# Patient Record
Sex: MALE | Race: WHITE | URBAN - METROPOLITAN AREA
[De-identification: names, ages, dates, MRNs, and addresses within clinical notes are randomized per-mention and may not be internally consistent; named-entity substitution may affect disease eponyms.]

---

## 2017-02-06 DIAGNOSIS — Z94.0 KIDNEY REPLACED BY TRANSPLANT: Primary | ICD-10-CM

## 2017-02-08 ENCOUNTER — TELEPHONE (OUTPATIENT)
Dept: TRANSPLANT | Facility: CLINIC | Age: 72
End: 2017-02-08

## 2017-02-08 RX ORDER — AZATHIOPRINE 50 MG/1
TABLET ORAL
Qty: 45 TABLET | Refills: 1 | Status: SHIPPED | OUTPATIENT
Start: 2017-02-08 | End: 2018-04-19

## 2017-02-08 RX ORDER — PREDNISONE 5 MG/1
5 TABLET ORAL DAILY
Qty: 90 TABLET | Refills: 1 | Status: SHIPPED | OUTPATIENT
Start: 2017-02-08 | End: 2018-04-19

## 2017-02-08 NOTE — TELEPHONE ENCOUNTER
"Spoke to Didier Brooks regarding his Rx Refills  Due for a follow up appointment    Didier Brooks upset due to the need for face to face appointment    -kidney transplant  At [facility] in 1981   Andrea questions the need to travel 70 miles to [facility] for an appointment -\"you just want my money \"  Reviewed this is standard practice for  RX refills (yearly appointments)-medicare    Andrea reports that he follow with is PCP once per year with transplant  Labs   -   Reviewed he can have his PCP Rx Refills with local PCP   Confirmed that he follows with Dermatology   Current immunosuppression azathioprine 25 mg once per day with Pred 5 mg   Andrea verbalized understanding will follow up with PCP for Rx Refills    Understands to make an appointment with [facility]             "

## 2017-02-08 NOTE — TELEPHONE ENCOUNTER
RX refills  Overdue for labs and follow up appointment    Issue Didier Peterson Cecilia does not have a transplant episode   Please call clarify follow up with kidney transplant    Currently only to give a 30 day supply due to lack of follow up appointments and transplant  Labs

## 2018-01-17 ENCOUNTER — TELEPHONE (OUTPATIENT)
Dept: TRANSPLANT | Facility: CLINIC | Age: 73
End: 2018-01-17

## 2018-01-17 NOTE — TELEPHONE ENCOUNTER
Patient Call: Transplant   Reason for call: patient would like to set up his appointments but not in [place] and is not sure where to go that is close to his home.

## 2018-01-18 NOTE — TELEPHONE ENCOUNTER
PLAN:  Call Didier Brooks and suggest seeing one of our Nephrologist at our outreach clinic in [place].  Or see a Nephrologist at [facility]  in [facility].  Otherwise he can find a Provider locally that is willing to prescribe his anti rejection medications.

## 2018-01-19 NOTE — TELEPHONE ENCOUNTER
Call placed to patient: Patient states that he's transferring care to [facility] (p [phone] f [phone]). He hasn't been assigned a provider yet however will request that notes from his future visits be faxed to [facility] for continued transplant monitoring.

## 2018-02-28 ENCOUNTER — OFFICE VISIT (OUTPATIENT)
Dept: UROLOGY | Facility: CLINIC | Age: 73
End: 2018-02-28
Payer: COMMERCIAL

## 2018-02-28 VITALS
HEIGHT: 70 IN | WEIGHT: 185 LBS | BODY MASS INDEX: 26.48 KG/M2 | DIASTOLIC BLOOD PRESSURE: 77 MMHG | SYSTOLIC BLOOD PRESSURE: 142 MMHG | HEART RATE: 78 BPM | RESPIRATION RATE: 16 BRPM

## 2018-02-28 DIAGNOSIS — R97.20 ELEVATED PROSTATE SPECIFIC ANTIGEN (PSA): ICD-10-CM

## 2018-02-28 DIAGNOSIS — N40.0 BENIGN PROSTATIC HYPERPLASIA, UNSPECIFIED WHETHER LOWER URINARY TRACT SYMPTOMS PRESENT: Primary | ICD-10-CM

## 2018-02-28 PROCEDURE — 99204 OFFICE O/P NEW MOD 45 MIN: CPT | Performed by: UROLOGY

## 2018-02-28 RX ORDER — TAMSULOSIN HYDROCHLORIDE 0.4 MG/1
0.8 CAPSULE ORAL DAILY
Qty: 180 CAPSULE | Refills: 3 | Status: SHIPPED | OUTPATIENT
Start: 2018-02-28

## 2018-02-28 NOTE — PROGRESS NOTES
Visit Date:   02/28/2018      REASON FOR VISIT TODAY:  Elevated PSA.      BRIEF COURSE:  Mr. Brooks is a 73-year-old gentleman with history of a renal transplant over 30 years ago who had been followed in our clinic previously for a history of elevated PSA.  The patient's PSAs have been noted to be in the 9-10 range dating back at least through 2013.  The patient notes that his PSAs were stable in that range including a value of 9.9 sometime in 2017.  The patient underwent a repeat PSA on 01/30/2018 that was measured at 17.  Of note, the patient has 2 previous negative prostate biopsies, one in 2006 and one in 2013.  The patient has noted some increase in urinary urgency and frequency over the last 6-12 months and notes that he sometimes voids every 20-30 minutes with up to 2 hours between voids.  He does drink Snapple tea on the order of a quart to a quart and a half per day.  Of note, this is caffeinated.  The patient does take Flomax to help with his urinary symptoms, but stopped the medication and felt that it did not make a huge difference when he stopped but nonetheless restarted it and continues on it currently.  In addition, the patient relates that he also has been taking saw palmetto, pygeum and pumpkin seed capsules for the last several years and credits an improvement in his macular degeneration as well as arthritic joint pain and improvements in GERD all to this supplement cocktail he has been taking.      PAST MEDICAL HISTORY:  Otherwise, again, significant for end-stage renal disease status post kidney transplant, hypertension.      PAST SURGICAL HISTORY:  Includes the renal transplant.      MEDICATIONS:  Include Flomax, prednisone, azathioprine, tadalafil, metoprolol, hydrochlorothiazide, amlodipine.      ALLERGIES:  NO ALLERGIES TO MEDICATIONS.      FAMILY HISTORY:  Noncontributory.      SOCIAL HISTORY:  Negative for tobacco and alcohol.      REVIEW OF SYSTEMS:  Negative for fevers, chills, sweats,  nausea, vomiting and unexplained weight changes.      PHYSICAL EXAMINATION:   VITAL:  His blood pressure today is 142/77, pulse is 78.   GENERAL:  He is in no acute distress.   GENITOURINARY:  The patient's AUA symptom score today is measured at 20/35 with a bothersome index of 3.  The patient was able to void 200 mL and a postvoid residual was measured at 0.      ASSESSMENT AND PLAN:  Over half of today's 45-minute visit was spent counseling the patient regarding his lower urinary tract symptoms and his elevated PSA.  I suggested to Mr. Brooks that it is unclear what the source of the rise of his PSA is at this time.  However, given the fact that the patient also has concomitant worsening of his lower urinary tract symptoms, it is certainly possible that these two events are related.  The patient may have continuing increase in the size of his prostate or some other inflammatory condition in his prostate that is causing his urinary symptoms that may also be causing a rise in the PSA.  Toward this end, we will go ahead and increase the patient's Flomax to 0.8 mg p.o. daily to see if this improves his urination.  We will see the patient back in 3 months' time at which point we will recheck his PSA to see if the PSA comes down.  We did discuss that patients do require a 10-15 year life expectancy to benefit from treatment of a screen-detected prostate cancer and thus, we will see what the repeat PSA is before we think about moving toward trying to exclude prostate cancer.  As a next step, the patient would get an MRI of the prostate if needed.  In addition, when the patient comes back in roughly 05/2018, we will plan on performing a cystoscopy at that time to further evaluate the patient unless his symptoms are markedly improved on the increase in the Flomax.  Mr. Brooks is in agreement with the plan.  We will see him back in May with a PSA and AUA symptom score and determine if we need to do the cystoscopy at that  time.         EJ OVALLES MD             D: 2018   T: 2018   MT: FERNANDO      Name:     LUDA SIGALA   MRN:      [id number]        Account:      [id number]   :      1945           Visit Date:   2018      Document: [id number]

## 2018-02-28 NOTE — NURSING NOTE
"Chief Complaint   Patient presents with     Elevated PSA       Initial /77 (BP Location: Left arm, Patient Position: Chair, Cuff Size: Adult Regular)  Pulse 78  Resp 16  Ht 1.765 m (5' 9.5\")  Wt 83.9 kg (185 lb)  BMI 26.93 kg/m2 Estimated body mass index is 26.93 kg/(m^2) as calculated from the following:    Height as of this encounter: 1.765 m (5' 9.5\").    Weight as of this encounter: 83.9 kg (185 lb).  BP completed using cuff size: regular      Rachele Pagan CMA     "

## 2018-02-28 NOTE — LETTER
[facility]  [address] [address]  [phone]        March 9, 2019    Didier Brooks  [address] [place] [address]              Dear Didier Brooks    This is to remind you that your PSA is due.    You may call our office at  to schedule an appointment.    Please disregard this notice if you have already had your labs drawn or made an appointment.        Sincerely,        Donny Kim MD

## 2018-02-28 NOTE — MR AVS SNAPSHOT
"              After Visit Summary   2/28/2018    Didier Brooks    MRN: [id number]           Patient Information     Date Of Birth          1945        Visit Information        Provider Department      2/28/2018 1:00 PM Donny Kim MD [facility]        Today's Diagnoses     Benign prostatic hyperplasia, unspecified whether lower urinary tract symptoms present    -  1    Elevated prostate specific antigen (PSA)          Care Instructions    Per Physician's instructions            Follow-ups after your visit        Your next 10 appointments already scheduled     May 31, 2018  1:45 PM CDT   CYSTO with Donny Kim MD   [facility]    [address] [address]   [phone]              Who to contact     If you have questions or need follow up information about today's clinic visit or your schedule please contact [facility] directly at [phone].  Normal or non-critical lab and imaging results will be communicated to you by MyChart, letter or phone within 4 business days after the clinic has received the results. If you do not hear from us within 7 days, please contact the clinic through [employer] or phone. If you have a critical or abnormal lab result, we will notify you by phone as soon as possible.  Submit refill requests through [employer] or call your pharmacy and they will forward the refill request to us. Please allow 3 business days for your refill to be completed.          Additional Information About Your Visit        [employer] Information     [employer] lets you send messages to your doctor, view your test results, renew your prescriptions, schedule appointments and more. To sign up, go to [email or web address] . Click on \"Log in\" on the left side of the screen, which will take you to the Welcome page. Then click on \"Sign up Now\" on the right side of the page.     You will be asked to " "enter the access code listed below, as well as some personal information. Please follow the directions to create your username and password.     Your access code is: [email or web address]  Expires: 2018 12:33 PM     Your access code will  in 90 days. If you need help or a new code, please call your [facility] clinic or [phone].        Care EveryWhere ID     This is your Care EveryWhere ID. This could be used by other organizations to access your [facility] medical records  [id number]        Your Vitals Were     Pulse Respirations Height BMI (Body Mass Index)          78 16 1.765 m (5' 9.5\") 26.93 kg/m2         Blood Pressure from Last 3 Encounters:   18 142/77   13 161/84    Weight from Last 3 Encounters:   18 83.9 kg (185 lb)   13 87.9 kg (193 lb 12.8 oz)                 Today's Medication Changes          These changes are accurate as of 18 11:59 PM.  If you have any questions, ask your nurse or doctor.               These medicines have changed or have updated prescriptions.        Dose/Directions    * tamsulosin 0.4 MG capsule   Commonly known as:  FLOMAX   This may have changed:  Another medication with the same name was added. Make sure you understand how and when to take each.   Changed by:  Donny Kim MD        Dose:  1 capsule   Take 1 capsule by mouth daily.   Refills:  0       * tamsulosin 0.4 MG capsule   Commonly known as:  FLOMAX   This may have changed:  You were already taking a medication with the same name, and this prescription was added. Make sure you understand how and when to take each.   Used for:  Benign prostatic hyperplasia, unspecified whether lower urinary tract symptoms present   Changed by:  Donny Kim MD        Dose:  0.8 mg   Take 2 capsules (0.8 mg) by mouth daily   Quantity:  180 capsule   Refills:  3       * Notice:  This list has 2 medication(s) that are the same as other medications prescribed for you. Read the " directions carefully, and ask your doctor or other care provider to review them with you.         Where to get your medicines      These medications were sent to [facility] [address] [place] [address]  [address] [place] [address]     Phone:  [phone]     tamsulosin 0.4 MG capsule                Primary Care Provider Fax #    Physician No Ref-Primary [phone]       No address on file        Equal Access to Services     JONH GIMENEZ : Hadii aad ku hadasho Soomaali, waaxda luqadaha, qaybta kaalmada adeegyada, waxay idiin hayaan michael izquierdo . So [facility] [phone].    ATENCIÓN: Si habla espti, tiene a coates disposición servicios gratuitos de asistencia lingüística. Maricarmen al [phone].    We comply with applicable federal civil rights laws and [place] laws. We do not discriminate on the basis of race, color, national origin, age, disability, sex, sexual orientation, or gender identity.            Thank you!     Thank you for choosing [facility]  for your care. Our goal is always to provide you with excellent care. Hearing back from our patients is one way we can continue to improve our services. Please take a few minutes to complete the written survey that you may receive in the mail after your visit with us. Thank you!             Your Updated Medication List - Protect others around you: Learn how to safely use, store and throw away your medicines at www.disposemymeds.org.          This list is accurate as of 2/28/18 11:59 PM.  Always use your most recent med list.                   Brand Name Dispense Instructions for use Diagnosis    amLODIPine 5 MG tablet    NORVASC    180 tablet    Take 2 tablets by mouth daily.    Kidney replaced by transplant       azaTHIOprine 50 MG tablet    IMURAN    45 tablet    Take 1/2 tablet (25 MG) by mouth daily    Kidney replaced by transplant       hydrochlorothiazide 25 MG tablet    HYDRODIURIL    90 tablet     Take 1 tablet by mouth daily.    Unspecified essential hypertension       metoprolol succinate 50 MG 24 hr tablet    TOPROL-XL     Take 50 mg by mouth daily        predniSONE 5 MG tablet    DELTASONE    90 tablet    Take 1 tablet (5 mg) by mouth daily    Kidney replaced by transplant       tadalafil 5 MG tablet    CIALIS    90 tablet    Take 1 tablet (5 mg) by mouth daily Never use with nitroglycerin, terazosin or doxazosin.    ED (erectile dysfunction)       * tamsulosin 0.4 MG capsule    FLOMAX     Take 1 capsule by mouth daily.        * tamsulosin 0.4 MG capsule    FLOMAX    180 capsule    Take 2 capsules (0.8 mg) by mouth daily    Benign prostatic hyperplasia, unspecified whether lower urinary tract symptoms present       * Notice:  This list has 2 medication(s) that are the same as other medications prescribed for you. Read the directions carefully, and ask your doctor or other care provider to review them with you.

## 2018-04-19 DIAGNOSIS — Z94.0 KIDNEY REPLACED BY TRANSPLANT: Primary | ICD-10-CM

## 2018-04-19 RX ORDER — PREDNISONE 5 MG/1
5 TABLET ORAL DAILY
Qty: 30 TABLET | Refills: 0 | Status: SHIPPED | OUTPATIENT
Start: 2018-04-19 | End: 2019-06-14

## 2018-04-19 RX ORDER — AZATHIOPRINE 50 MG/1
25 TABLET ORAL DAILY
Qty: 15 TABLET | Refills: 0 | Status: SHIPPED | OUTPATIENT
Start: 2018-04-19 | End: 2019-06-14
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
## 2018-04-19 NOTE — TELEPHONE ENCOUNTER
Patient Call: Patient Call: Medication Refill  Route to LPN  Instruct the patient to first contact their pharmacy. If they have called their pharmacy and require further assistance, route to LPN.  Pharmacy Name: [facility]  Pharmacy Location: [place], [place]  Name of Medication: Azathiopine 50 mg   When will the patient be out of this medication?: Greater than 3 days (Route standard priority)   Patient states he only needs enough for 15 days until he see his doctor on the 15 of May.

## 2018-04-30 ENCOUNTER — TELEPHONE (OUTPATIENT)
Dept: TRANSPLANT | Facility: CLINIC | Age: 73
End: 2018-04-30

## 2018-04-30 NOTE — TELEPHONE ENCOUNTER
Discussed Shingrix vaccine with Didier Brooks.  Explained as long as it is not a LIVE vaccine.  Verbalized understanding.

## 2018-04-30 NOTE — TELEPHONE ENCOUNTER
Patient call:    Patient needs to know if he can get a shot for shingles. Due to wife having shingles

## 2018-06-12 ENCOUNTER — TELEPHONE (OUTPATIENT)
Dept: TRANSPLANT | Facility: CLINIC | Age: 73
End: 2018-06-12

## 2018-10-31 ENCOUNTER — TELEPHONE (OUTPATIENT)
Dept: UROLOGY | Facility: CLINIC | Age: 73
End: 2018-10-31

## 2018-10-31 NOTE — TELEPHONE ENCOUNTER
Reason for Call:  Other call back    Detailed comments: pt calling stating he would like his PSA order faxed to [facility]. Fax [phone]    Phone Number Patient can be reached at: Cell number on file:    Telephone Information:   Mobile [phone]       Best Time: any     Can we leave a detailed message on this number? YES    Call taken on 10/31/2018 at 9:18 AM by Amy Moralez

## 2018-10-31 NOTE — TELEPHONE ENCOUNTER
Didier is notified that the lab order has been faxed to [facility], with a request to fax the results back to us for Dr AMANUEL Kim at [phone]. Deidre GUTIERREZ Rn

## 2018-11-01 ENCOUNTER — TRANSFERRED RECORDS (OUTPATIENT)
Dept: HEALTH INFORMATION MANAGEMENT | Facility: CLINIC | Age: 73
End: 2018-11-01

## 2018-11-08 ENCOUNTER — OFFICE VISIT (OUTPATIENT)
Dept: UROLOGY | Facility: CLINIC | Age: 73
End: 2018-11-08
Payer: COMMERCIAL

## 2018-11-08 VITALS — RESPIRATION RATE: 16 BRPM | DIASTOLIC BLOOD PRESSURE: 74 MMHG | HEART RATE: 75 BPM | SYSTOLIC BLOOD PRESSURE: 129 MMHG

## 2018-11-08 DIAGNOSIS — N40.0 BENIGN PROSTATIC HYPERPLASIA, UNSPECIFIED WHETHER LOWER URINARY TRACT SYMPTOMS PRESENT: Primary | ICD-10-CM

## 2018-11-08 PROCEDURE — 99214 OFFICE O/P EST MOD 30 MIN: CPT | Performed by: UROLOGY

## 2018-11-08 RX ORDER — TAMSULOSIN HYDROCHLORIDE 0.4 MG/1
0.8 CAPSULE ORAL DAILY
Qty: 180 CAPSULE | Refills: 3 | Status: SHIPPED | OUTPATIENT
Start: 2018-11-08 | End: 2019-05-31

## 2018-11-08 NOTE — MR AVS SNAPSHOT
After Visit Summary   11/8/2018    Didier Brooks    MRN: [id number]           Patient Information     Date Of Birth          1945        Visit Information        Provider Department      11/8/2018 1:45 PM Donny Kim MD [facility]        Today's Diagnoses     Benign prostatic hyperplasia, unspecified whether lower urinary tract symptoms present    -  1       Follow-ups after your visit        Who to contact     If you have questions or need follow up information about today's clinic visit or your schedule please contact [facility] directly at [phone].  Normal or non-critical lab and imaging results will be communicated to you by MyChart, letter or phone within 4 business days after the clinic has received the results. If you do not hear from us within 7 days, please contact the clinic through MyChart or phone. If you have a critical or abnormal lab result, we will notify you by phone as soon as possible.  Submit refill requests through [employer] or call your pharmacy and they will forward the refill request to us. Please allow 3 business days for your refill to be completed.          Additional Information About Your Visit        Care EveryWhere ID     This is your Care EveryWhere ID. This could be used by other organizations to access your [facility] medical records  [id number]        Your Vitals Were     Pulse Respirations                75 16           Blood Pressure from Last 3 Encounters:   11/08/18 129/74   02/28/18 142/77   11/13/13 161/84    Weight from Last 3 Encounters:   02/28/18 83.9 kg (185 lb)   11/13/13 87.9 kg (193 lb 12.8 oz)              Today, you had the following     No orders found for display         Today's Medication Changes          These changes are accurate as of 11/8/18 11:59 PM.  If you have any questions, ask your nurse or doctor.               These medicines have changed or have updated prescriptions.         Dose/Directions    * tamsulosin 0.4 MG capsule   Commonly known as:  FLOMAX   This may have changed:  Another medication with the same name was added. Make sure you understand how and when to take each.   Changed by:  Donny Kim MD        Dose:  1 capsule   Take 1 capsule by mouth daily.   Refills:  0       * tamsulosin 0.4 MG capsule   Commonly known as:  FLOMAX   This may have changed:  Another medication with the same name was added. Make sure you understand how and when to take each.   Used for:  Benign prostatic hyperplasia, unspecified whether lower urinary tract symptoms present   Changed by:  Donny Kim MD        Dose:  0.8 mg   Take 2 capsules (0.8 mg) by mouth daily   Quantity:  180 capsule   Refills:  3       * tamsulosin 0.4 MG capsule   Commonly known as:  FLOMAX   This may have changed:  You were already taking a medication with the same name, and this prescription was added. Make sure you understand how and when to take each.   Used for:  Benign prostatic hyperplasia, unspecified whether lower urinary tract symptoms present   Changed by:  Donny Kim MD        Dose:  0.8 mg   Take 2 capsules (0.8 mg) by mouth daily   Quantity:  180 capsule   Refills:  3       * Notice:  This list has 3 medication(s) that are the same as other medications prescribed for you. Read the directions carefully, and ask your doctor or other care provider to review them with you.         Where to get your medicines      These medications were sent to [facility] [address] [place] [address]     Phone:  [phone]     tamsulosin 0.4 MG capsule                Primary Care Provider Fax #    Physician No Ref-Primary [phone]       No address on file        Equal Access to Services     JONH GIMENEZ AH: Alexia Michelle, tyler cerda, shama robles  la'janelle olguin. So [facility] [phone].    ATENCIÓN: Si habla tamika, tiene a coates disposición servicios gratuitos de asistencia lingüística. Maricarmen yo [phone].    We comply with applicable federal civil rights laws and [place] laws. We do not discriminate on the basis of race, color, national origin, age, disability, sex, sexual orientation, or gender identity.            Thank you!     Thank you for choosing [facility]  for your care. Our goal is always to provide you with excellent care. Hearing back from our patients is one way we can continue to improve our services. Please take a few minutes to complete the written survey that you may receive in the mail after your visit with us. Thank you!             Your Updated Medication List - Protect others around you: Learn how to safely use, store and throw away your medicines at www.disposemymeds.org.          This list is accurate as of 11/8/18 11:59 PM.  Always use your most recent med list.                   Brand Name Dispense Instructions for use Diagnosis    amLODIPine 5 MG tablet    NORVASC    180 tablet    Take 2 tablets by mouth daily.    Kidney replaced by transplant       azaTHIOprine 50 MG tablet    IMURAN    15 tablet    Take 0.5 tablets (25 mg) by mouth daily Take 1/2 tablet (25 MG) by mouth daily    Kidney replaced by transplant       hydrochlorothiazide 25 MG tablet    HYDRODIURIL    90 tablet    Take 1 tablet by mouth daily.    Unspecified essential hypertension       metoprolol succinate 50 MG 24 hr tablet    TOPROL-XL     Take 50 mg by mouth daily        predniSONE 5 MG tablet    DELTASONE    30 tablet    Take 1 tablet (5 mg) by mouth daily    Kidney replaced by transplant       tadalafil 5 MG tablet    CIALIS    90 tablet    Take 1 tablet (5 mg) by mouth daily Never use with nitroglycerin, terazosin or doxazosin.    ED (erectile dysfunction)       * tamsulosin 0.4 MG capsule    FLOMAX     Take 1 capsule by mouth daily.        *  tamsulosin 0.4 MG capsule    FLOMAX    180 capsule    Take 2 capsules (0.8 mg) by mouth daily    Benign prostatic hyperplasia, unspecified whether lower urinary tract symptoms present       * tamsulosin 0.4 MG capsule    FLOMAX    180 capsule    Take 2 capsules (0.8 mg) by mouth daily    Benign prostatic hyperplasia, unspecified whether lower urinary tract symptoms present       * Notice:  This list has 3 medication(s) that are the same as other medications prescribed for you. Read the directions carefully, and ask your doctor or other care provider to review them with you.

## 2018-11-10 NOTE — PROGRESS NOTES
Visit Date:   11/08/2018      REASON FOR VISIT TODAY:  Elevated PSA.      HISTORY OF PRESENT ILLNESS:  This is a reliable 73-year-old gentleman with a history of a kidney transplant 30 years ago who has been followed in our clinic for history of an elevated PSA.  The patient's PSAs have been in the 9 to 10 range since 2013.  The patient had a PSA that did go up on 01/30/2018.  It is to a level of 17.  He does have 2 negative prostate biopsies in the past, one in 2006 and one in 2013.  The patient does have some lower urinary tract symptoms and has been on Flomax 0.8 mg p.o. daily.  He notes that his symptoms are essentially stable at that level of Flomax.  The patient recently underwent a repeat PSAs over this year and comes in today to discuss these further.      PHYSICAL EXAMINATION:   VITAL SIGNS:  On exam today, his blood pressure 129/74, pulse 75.   GENERAL:  He is in no acute distress.      DATA:  The patient has a PSA from 07/20/18 of 12.1 and a PSA from 11/01/18 of 12.4.  The patient's digital rectal exam reveals prostate mildly enlarged but smooth and without nodularity.      The patient's AUA symptom score today was measured at 11/35, bothersome index of 2.      ASSESSMENT AND PLAN:  Over half of today's 25-minute visit was spent counseling the patient and his wife regarding his lower urinary tract symptoms and his elevated PSA.  I suggested to Mr. Brooks that we are pleased to see his PSA has come back down some from 17 back down to 12.  We discussed options at this point including moving forward with an MRI and possible prostate biopsy versus continued observation.  Mr. Brooks notes that he is not worried at this point about developing prostate cancer and is not very much interested in further testing.  I did suggest to the patient that given his age of 73 years, he certainly is outside of the standard screening population, but the patient has had abnormal PSAs, which is why we continue to check them up to  this point.  However, if the patient does not wish to consider further intervention even if his PSAs are somewhat elevated, then I counseled the patient that we do not necessarily need to continue to check them regularly, but we should certainly continue with the digital rectal exams at least, as if the patient were to develop an abnormal digital rectal exam, it is much more likely he has a clinically significant cancer in that situation.  The patient is in agreement with this approach, understanding that there could be prostate cancer there that we are not diagnosing.  The patient was given a refill on his Flomax 0.8 mg p.o. daily, and we will see him back in a year for further followup.         EJ OVALLES MD             D: 2018   T: 11/10/2018   MT: ROSSI      Name:     LUDA SIGALA   MRN:      [id number]        Account:      [id number]   :      1945           Visit Date:   2018      Document: [id number]

## 2019-03-19 ENCOUNTER — TELEPHONE (OUTPATIENT)
Dept: TRANSPLANT | Facility: CLINIC | Age: 74
End: 2019-03-19

## 2019-03-19 NOTE — TELEPHONE ENCOUNTER
Message sent to Steph Huertas re: Didier Brooks requesting to be schedule at [facility] this April in the afternoon.  Patient was made aware that we do not know if spots are available but a  will call him

## 2019-05-31 ENCOUNTER — TELEPHONE (OUTPATIENT)
Dept: TRANSPLANT | Facility: CLINIC | Age: 74
End: 2019-05-31

## 2019-05-31 DIAGNOSIS — Z48.298 AFTERCARE FOLLOWING ORGAN TRANSPLANT: ICD-10-CM

## 2019-05-31 DIAGNOSIS — Z79.899 IMMUNOSUPPRESSIVE MANAGEMENT ENCOUNTER FOLLOWING KIDNEY TRANSPLANT: ICD-10-CM

## 2019-05-31 DIAGNOSIS — Z94.0 KIDNEY TRANSPLANTED: Primary | ICD-10-CM

## 2019-05-31 DIAGNOSIS — Z94.0 IMMUNOSUPPRESSIVE MANAGEMENT ENCOUNTER FOLLOWING KIDNEY TRANSPLANT: ICD-10-CM

## 2019-05-31 RX ORDER — SIROLIMUS 0.5 MG/1
1 TABLET, FILM COATED ORAL DAILY
Qty: 60 TABLET | Refills: 11 | Status: SHIPPED | OUTPATIENT
Start: 2019-05-31 | End: 2019-06-05 | Stop reason: ALTCHOICE

## 2019-05-31 NOTE — LETTER
PHYSICIAN ORDERS    DATE & TIME ISSUED: 2019 12:06 PM  PATIENT NAME: Didier Brooks   : 1945     [facility] MR# [if applicable]: [id number]     DIAGNOSIS / ICD - 10 CODES    Kidney Transplanted (Z94.0)    After Care Following Organ Transplant (Z48.298)    Long Term Use of Medication (Z79.899)    Complications Kidney Transplant (T86.10)    * Do not draw Drug levels.     Every 3 months    Hemogram and Platelet    Basic Metabolic Panel (Sodium,potassium,chloride,CO2,creatinine,urea,nitrogen,glucose,calcium)    Every 6 months    Urine for protein creatinine ratio      Patient should release information to the [facility].   Please fax results to the Transplant Center at [phone].  Any questions please call [phone] or [phone].        .

## 2019-05-31 NOTE — LETTER
PHYSICIAN ORDERS    DATE & TIME ISSUED: 2019 12:06 PM  PATIENT NAME: Didier Brooks   : 1945     [facility] MR# [if applicable]: [id number]     DIAGNOSIS / ICD - 10 CODES    Kidney Transplanted (Z94.0)    After Care Following Organ Transplant (Z48.298)    Long Term Use of Medication (Z79.899)    Complications Kidney Transplant (T86.10)    * Do not draw Drug levels.     Every 3 months    Hemogram and Platelet    Basic Metabolic Panel (Sodium,potassium,chloride,CO2,creatinine,urea,nitrogen,glucose,calcium)    Every 6 months    Urine for protein creatinine ratio      Patient should release information to the [facility].   Please fax results to the Transplant Center at [phone].  Any questions please call [phone] or [phone].        .

## 2019-05-31 NOTE — TELEPHONE ENCOUNTER
Haylee Rodriguez LPN [place], Teresa, EMMA             Per Dr. Capellan request, patient should start Sirolimus 1 mg daily and stop Imuran once therapeutic.  Sirolimus goal = 4-6.  Please call patient next week to begin this process.  I have updated annual lab orders only.  Thanks!       PLAN:  Call Didier Peterson Cecilia and start Sirolimus 1 mg daily.  Stop Imuran once Sirolimus levels therapeutic.  Sirolimus goal 4-6.  Will need weekly labs.  Lab Letter in [facility].

## 2019-05-31 NOTE — LETTER
PHYSICIAN ORDERS    DATE & TIME ISSUED: May 31, 2019 2:15 PM  PATIENT NAME: Didier Brooks   : 1945     [facility] MR# [if applicable]: [id number]     DIAGNOSIS / ICD - 10 CODES    Kidney Transplanted (Z94.0)    After Care Following Organ Transplant (Z48.298)    Long Term Use of Medication (Z79.899)    Complete the following labs weekly x 4:    Complete blood count    Basic metabolic panel    Sirolimus level    On week #4, please add the following labs:    Urine protein / creatinine ratio    Fasting lipid panel    Hepatic panel    Patient should release information to the [facility].   Please fax results to the Transplant Center at [phone].  Any questions please call [phone].      .

## 2019-06-05 ENCOUNTER — TELEPHONE (OUTPATIENT)
Dept: TRANSPLANT | Facility: CLINIC | Age: 74
End: 2019-06-05

## 2019-06-05 NOTE — TELEPHONE ENCOUNTER
Called Didier Brooks who states he changed his mind about switching from Imuran to Sirolimus.  States he wasn't able to elaborate and discuss it well with Dr. Capellan during clinic but has thought about it for a while and would rather stay with his current meds.  He made it for 37 years with it so he feels it does not make sense to make changes right now.   Appreciates call and follow up. Will make Dr. Capellan aware.    Discontinued Med in Epic.    Updated Annual Lab letter and faxed to:  [facility] [phone] (Phone)  [phone] (Fax)

## 2019-06-14 ENCOUNTER — TELEPHONE (OUTPATIENT)
Dept: TRANSPLANT | Facility: CLINIC | Age: 74
End: 2019-06-14

## 2019-06-14 DIAGNOSIS — Z94.0 KIDNEY REPLACED BY TRANSPLANT: ICD-10-CM

## 2019-06-14 RX ORDER — AZATHIOPRINE 50 MG/1
25 TABLET ORAL DAILY
Qty: 15 TABLET | Refills: 11 | Status: SHIPPED | OUTPATIENT
Start: 2019-06-14 | End: 2020-03-30

## 2019-06-14 RX ORDER — PREDNISONE 5 MG/1
2.5 TABLET ORAL DAILY
Qty: 15 TABLET | Refills: 11 | Status: SHIPPED | OUTPATIENT
Start: 2019-06-14 | End: 2020-03-30

## 2019-06-14 NOTE — TELEPHONE ENCOUNTER
Timi, MD Dario Gates Teresa, RN             See my note, which I just finished.     I would recommend he then start mycophenolate mofetil 500 mg bid and stop azathioprine.     Delvis       Dr. Capellan's Clinic note from 5/31/19  Immunosuppression: Azathioprine (dose 12.5 mg daily) and Prednisone (dose 2.5 mg daily)  - Changes: Yes - due to recurrent skin cancers, discussed immunosuppression options and recommended change from azathioprine to mTORi versus mycophenolate.    Patient is going to consider options and let us know.      PLAN:  Call Didier Brooks and discuss Dr. Capellan's recommendations to switch AZA to mmf.    OUTCOME:  States he doesn't think switching medications will be beneficial to him right now.  Will make Dr. Capellan aware.      Requested AZA and Pred refills sent to:  [facility] [address] [place] [address] [phone] (Phone)  [phone] (Fax)

## 2019-10-04 ENCOUNTER — TELEPHONE (OUTPATIENT)
Dept: UROLOGY | Facility: CLINIC | Age: 74
End: 2019-10-04

## 2019-10-04 NOTE — TELEPHONE ENCOUNTER
Reason for Call:  Other     Detailed comments: Pt states he needs to see provider for an enlarged prostate. No openings until 01/2020 and pt does not think he should wait that long. - Please advise    Phone Number Patient can be reached at: Cell number on file:    Telephone Information:   Mobile [phone]       Best Time: Any    Can we leave a detailed message on this number? YES    Call taken on 10/4/2019 at 9:06 AM by Denise Behrendt

## 2019-10-04 NOTE — TELEPHONE ENCOUNTER
I talked to Didier today and let him know that I would be able to get him in with Dr Payne this month instead of waiting until next year with Dr Kim. He says that he lives near [place] and so with go to see his Urologist near them. Deidre GUTIERREZ Rn

## 2019-12-19 DIAGNOSIS — N40.0 BENIGN PROSTATIC HYPERPLASIA, UNSPECIFIED WHETHER LOWER URINARY TRACT SYMPTOMS PRESENT: ICD-10-CM

## 2019-12-19 RX ORDER — TAMSULOSIN HYDROCHLORIDE 0.4 MG/1
CAPSULE ORAL
Refills: 0 | OUTPATIENT
Start: 2019-12-19

## 2019-12-19 NOTE — TELEPHONE ENCOUNTER
Spoke with patient regarding refill for Tamsulosin, patient confirmed that he continues to take medication.  Rx was discontinued on 5/31/19(medication reconciliation clean up)  Patient informed that his LOV: 11/8/18 with recommended 1 year follow-up  Patient states he will call his PCP to get Rx for tamsulosin refilled.    Patient to call back if PCP does not refill medication    Patient verbalized understanding    Sulema Jernigan RN

## 2020-03-30 ENCOUNTER — VIRTUAL VISIT (OUTPATIENT)
Dept: NEPHROLOGY | Facility: CLINIC | Age: 75
End: 2020-03-30

## 2020-03-30 DIAGNOSIS — D84.9 IMMUNOSUPPRESSION (H): ICD-10-CM

## 2020-03-30 DIAGNOSIS — Z94.0 HTN, KIDNEY TRANSPLANT RELATED: Primary | ICD-10-CM

## 2020-03-30 DIAGNOSIS — Z94.0 KIDNEY REPLACED BY TRANSPLANT: ICD-10-CM

## 2020-03-30 DIAGNOSIS — I15.1 HTN, KIDNEY TRANSPLANT RELATED: Primary | ICD-10-CM

## 2020-03-30 DIAGNOSIS — Z48.298 AFTERCARE FOLLOWING ORGAN TRANSPLANT: ICD-10-CM

## 2020-03-30 RX ORDER — AMLODIPINE BESYLATE 10 MG/1
10 TABLET ORAL AT BEDTIME
Qty: 90 TABLET | Refills: 3 | Status: SHIPPED | OUTPATIENT
Start: 2020-03-30

## 2020-03-30 RX ORDER — AZATHIOPRINE 50 MG/1
25 TABLET ORAL DAILY
Qty: 45 TABLET | Refills: 3 | Status: SHIPPED | OUTPATIENT
Start: 2020-03-30 | End: 2020-07-22

## 2020-03-30 RX ORDER — PREDNISONE 5 MG/1
2.5 TABLET ORAL DAILY
Qty: 45 TABLET | Refills: 3 | Status: SHIPPED | OUTPATIENT
Start: 2020-03-30 | End: 2020-07-22

## 2020-03-30 RX ORDER — METOPROLOL TARTRATE 25 MG/1
25 TABLET, FILM COATED ORAL 2 TIMES DAILY
Qty: 180 TABLET | Refills: 3 | Status: SHIPPED | OUTPATIENT
Start: 2020-03-30

## 2020-03-30 RX ORDER — HYDROCHLOROTHIAZIDE 12.5 MG/1
12.5 TABLET ORAL DAILY
Qty: 90 TABLET | Refills: 3 | Status: SHIPPED | OUTPATIENT
Start: 2020-03-30 | End: 2022-06-17

## 2020-03-30 NOTE — PROGRESS NOTES
"TRANSPLANT NEPHROLOGY TELEMEDICINE VISIT    Didier Brooks is a 75 year old male who is being evaluated via a billable telemedicine (video/telephone) visit on March 29, 2020.     The patient has been notified of following:     \"This telemedicine (video/telephone) visit will be conducted via a video/phone call between you and your physician. We have found that certain health care needs can be provided without the need for a physical exam.  This service lets us provide the care you need with a short video/phone conversation.  If a prescription is necessary, we can send it directly to your pharmacy.  If lab work is needed, we can place an order for that and you can then stop by our lab to have the test done at a later time.    If during the course of this video/phone call the physician feels a telemedicine (video/telephone) visit is not appropriate, you will not be charged for this service and an in clinic visit will be arranged at a later date.\"     Assessment & Plan   # LDKT: Stable   - Baseline Cr ~ 1.3-1.4   - Proteinuria: Not checked recently   - Date DSA Last Checked: Not Known      Latest DSA: Not checked recently due to time from transplant   - BK Viremia: Not checked recently due to time from transplant   - Kidney Tx Biopsy: No    # Immunosuppression: Azathioprine (dose 25 mg daily) and Prednisone (dose 2.5 mg daily)   - Changes: No    # Infection Prophylaxis:   - PJP: None    # Hypertension: Controlled, but low at times;  Goal BP: < 130/80   - Changes: Yes - Will decrease hydrochlorothiazide to 12.5 mg daily.    # Electrolytes:   - Potassium; level: Low        On Supplement: No; Recommend increasing dietary potassium and will also decrease thiazide diuretic.  - Bicarbonate; level: High normal        On Supplement: No; Will decrease thiazide diuretic.    # BPH: Asymptomatic with no problems emptying his bladder on tamsulosin.    # Skin Cancer: New lesions: a couple   - Discussed sun protection and " "recommend regular follow up with Dermatology.    # Medical Compliance: No.  Evidence of labs less than recommended.  - Discussed importance of checking labs regularly as recommended, taking medications as prescribed and attending scheduled medical appointments.      # COVID-19 Virus Review: Discussed COVID-19 virus and the potential medical risks.  Reviewed preventative health recommendations, which includes washing hands for 20 seconds, avoid touching your face, and social distancing.  Asked patient to inform the transplant center if they are exposed or diagnosed with this virus.    # Transplant History:  Etiology of Kidney Failure: Unknown etiology  Tx: LDKT  Significant changes in immunosuppression: Decreased immunosuppression due to recurrent skin cancer.  Significant transplant-related complications: Recurrent Skin Cancers    Transplant Office Phone Number: [phone]    Assessment and plan was discussed with the patient and he voiced his understanding and agreement.    Return Visit: 12 months      Didier Brooks has a clinical telephone visit for routine follow up.    History of Present Illness    Mr. Brooks reports feeling good overall with some medical complaints.  Both he and his wife are self isolating themselves with the COVID-19 virus concerns.  Since last clinic visit, patient reports no hospitalizations or new medical complaints and has been doing well overall.  His energy level is \"terrific\" and has been normal.  He is active, although not getting as much exercise as he isn't going outside much do to the virus.  Denies any chest pain or shortness of breath with exertion.  Appetite is good, but patient has tried to eat less and has lost about 10-15 lbs.  No nausea, vomiting or diarrhea.  No fever, sweats or chills.  No leg swelling.  He feels he is emptying his bladder well.    Recent Hospitalizations:  [x] No [] Yes    New Medical Issues: [x] No [] Yes    Decreased energy: [x] No [] Yes    Chest " pain or SOB with exertion:  [x] No [] Yes    Appetite change or weight change: [x] No [] Yes    Nausea, vomiting or diarrhea:  [x] No [] Yes    Fever, sweats or chills: [x] No [] Yes    Leg swelling: [x] No [] Yes      Home BP: 110/70s, but did have one day it was down in the 80s systolic.    Review of Systems   A comprehensive review of systems was obtained and negative, except as noted in the HPI or PMH.    I have reviewed and updated the patient's Past Medical History, Social History, and Medication List.    Active Medical Problems  Patient Active Problem List   Diagnosis     Kidney replaced by transplant     Immunosuppression (H)     HTN, kidney transplant related     Dyslipidemia     BPH (benign prostatic hyperplasia)     Arthritis     Skin cancer     Allergies  Patient has no known allergies.    Medications  Current Outpatient Medications   Medication Sig     amLODIPine (NORVASC) 5 MG tablet Take 2 tablets by mouth daily.     azaTHIOprine (IMURAN) 50 MG tablet Take 0.5 tablets (25 mg) by mouth daily Take 1/2 tablet (25 MG) by mouth daily     hydrochlorothiazide (HYDRODIURIL) 25 MG tablet Take 1 tablet by mouth daily.     metoprolol (TOPROL-XL) 50 MG 24 hr tablet Take 50 mg by mouth daily     predniSONE (DELTASONE) 5 MG tablet Take 0.5 tablets (2.5 mg) by mouth daily     tadalafil (CIALIS) 5 MG tablet Take 1 tablet (5 mg) by mouth daily Never use with nitroglycerin, terazosin or doxazosin. (Patient not taking: Reported on 5/31/2019)     tamsulosin (FLOMAX) 0.4 MG capsule Take 2 capsules (0.8 mg) by mouth daily     No current facility-administered medications for this visit.        Phone call contact time:  Call Started at 1100  Call Ended at 1128    Bart Capellan MD

## 2020-03-30 NOTE — LETTER
"3/30/2020      RE: Didier Brooks  [address] [place] [id number]       TRANSPLANT NEPHROLOGY TELEMEDICINE VISIT    Didier Brooks is a 75 year old male who is being evaluated via a billable telemedicine (video/telephone) visit on March 29, 2020.     The patient has been notified of following:     \"This telemedicine (video/telephone) visit will be conducted via a video/phone call between you and your physician. We have found that certain health care needs can be provided without the need for a physical exam.  This service lets us provide the care you need with a short video/phone conversation.  If a prescription is necessary, we can send it directly to your pharmacy.  If lab work is needed, we can place an order for that and you can then stop by our lab to have the test done at a later time.    If during the course of this video/phone call the physician feels a telemedicine (video/telephone) visit is not appropriate, you will not be charged for this service and an in clinic visit will be arranged at a later date.\"     Assessment & Plan   # LDKT: Stable   - Baseline Cr ~ 1.3-1.4   - Proteinuria: Not checked recently   - Date DSA Last Checked: Not Known      Latest DSA: Not checked recently due to time from transplant   - BK Viremia: Not checked recently due to time from transplant   - Kidney Tx Biopsy: No    # Immunosuppression: Azathioprine (dose 25 mg daily) and Prednisone (dose 2.5 mg daily)   - Changes: No    # Infection Prophylaxis:   - PJP: None    # Hypertension: Controlled, but low at times;  Goal BP: < 130/80   - Changes: Yes - Will decrease hydrochlorothiazide to 12.5 mg daily.    # Electrolytes:   - Potassium; level: Low        On Supplement: No; Recommend increasing dietary potassium and will also decrease thiazide diuretic.  - Bicarbonate; level: High normal        On Supplement: No; Will decrease thiazide diuretic.    # BPH: Asymptomatic with no problems emptying his bladder on " "tamsulosin.    # Skin Cancer: New lesions: a couple   - Discussed sun protection and recommend regular follow up with Dermatology.    # Medical Compliance: No.  Evidence of labs less than recommended.  - Discussed importance of checking labs regularly as recommended, taking medications as prescribed and attending scheduled medical appointments.      # COVID-19 Virus Review: Discussed COVID-19 virus and the potential medical risks.  Reviewed preventative health recommendations, which includes washing hands for 20 seconds, avoid touching your face, and social distancing.  Asked patient to inform the transplant center if they are exposed or diagnosed with this virus.    # Transplant History:  Etiology of Kidney Failure: Unknown etiology  Tx: LDKT  Significant changes in immunosuppression: Decreased immunosuppression due to recurrent skin cancer.  Significant transplant-related complications: Recurrent Skin Cancers    Transplant Office Phone Number: [phone]    Assessment and plan was discussed with the patient and he voiced his understanding and agreement.    Return Visit: 12 months      Didier Brooks has a clinical telephone visit for routine follow up.    History of Present Illness    Mr. Brooks reports feeling good overall with some medical complaints.  Both he and his wife are self isolating themselves with the COVID-19 virus concerns.  Since last clinic visit, patient reports no hospitalizations or new medical complaints and has been doing well overall.  His energy level is \"terrific\" and has been normal.  He is active, although not getting as much exercise as he isn't going outside much do to the virus.  Denies any chest pain or shortness of breath with exertion.  Appetite is good, but patient has tried to eat less and has lost about 10-15 lbs.  No nausea, vomiting or diarrhea.  No fever, sweats or chills.  No leg swelling.  He feels he is emptying his bladder well.    Recent Hospitalizations:  [x] No [] " Yes    New Medical Issues: [x] No [] Yes    Decreased energy: [x] No [] Yes    Chest pain or SOB with exertion:  [x] No [] Yes    Appetite change or weight change: [x] No [] Yes    Nausea, vomiting or diarrhea:  [x] No [] Yes    Fever, sweats or chills: [x] No [] Yes    Leg swelling: [x] No [] Yes      Home BP: 110/70s, but did have one day it was down in the 80s systolic.    Review of Systems   A comprehensive review of systems was obtained and negative, except as noted in the HPI or PMH.    I have reviewed and updated the patient's Past Medical History, Social History, and Medication List.    Active Medical Problems  Patient Active Problem List   Diagnosis     Kidney replaced by transplant     Immunosuppression (H)     HTN, kidney transplant related     Dyslipidemia     BPH (benign prostatic hyperplasia)     Arthritis     Skin cancer     Allergies  Patient has no known allergies.    Medications  Current Outpatient Medications   Medication Sig     amLODIPine (NORVASC) 5 MG tablet Take 2 tablets by mouth daily.     azaTHIOprine (IMURAN) 50 MG tablet Take 0.5 tablets (25 mg) by mouth daily Take 1/2 tablet (25 MG) by mouth daily     hydrochlorothiazide (HYDRODIURIL) 25 MG tablet Take 1 tablet by mouth daily.     metoprolol (TOPROL-XL) 50 MG 24 hr tablet Take 50 mg by mouth daily     predniSONE (DELTASONE) 5 MG tablet Take 0.5 tablets (2.5 mg) by mouth daily     tadalafil (CIALIS) 5 MG tablet Take 1 tablet (5 mg) by mouth daily Never use with nitroglycerin, terazosin or doxazosin. (Patient not taking: Reported on 5/31/2019)     tamsulosin (FLOMAX) 0.4 MG capsule Take 2 capsules (0.8 mg) by mouth daily     No current facility-administered medications for this visit.        Phone call contact time:  Call Started at 1100  Call Ended at 1128    Bart Capellan MD

## 2020-07-22 DIAGNOSIS — Z94.0 KIDNEY REPLACED BY TRANSPLANT: Primary | ICD-10-CM

## 2020-07-22 RX ORDER — AZATHIOPRINE 50 MG/1
25 TABLET ORAL DAILY
Qty: 45 TABLET | Refills: 0 | Status: SHIPPED | OUTPATIENT
Start: 2020-07-22 | End: 2020-11-11

## 2020-07-22 RX ORDER — PREDNISONE 5 MG/1
2.5 TABLET ORAL DAILY
Qty: 45 TABLET | Refills: 0 | Status: SHIPPED | OUTPATIENT
Start: 2020-07-22 | End: 2020-11-10

## 2020-07-22 NOTE — TELEPHONE ENCOUNTER
Patient Call: Voicemail  Date/Time: 7/22/20 907am  Reason for call: Patient left a voicemail requesting a call back, he needs a refill. He did not state medication name on the message

## 2020-07-22 NOTE — LETTER
PHYSICIAN ORDERS    DATE & TIME ISSUED: 2020  1:36 PM  PATIENT NAME: Didier Brooks   : 1945     [facility] MR# [if applicable]: [id number]     DIAGNOSIS / ICD - 10 CODES    Kidney Transplanted (Z94.0)    After Care Following Organ Transplant (Z48.298)    Long Term Use of Medication (Z79.899)    Complications Kidney Transplant (T86.10)    * Do not draw Drug levels.     Every 3 months    Hemogram and Platelet    Basic Metabolic Panel (Sodium,potassium,chloride,CO2,creatinine,urea,nitrogen,glucose,calcium)    Every 6 months    Urine for protein creatinine ratio      Patient should release information to the [facility].   Please fax results to the Transplant Center at [phone].  Any questions please call [phone] or [phone].        .

## 2020-07-24 ENCOUNTER — TELEPHONE (OUTPATIENT)
Dept: TRANSPLANT | Facility: CLINIC | Age: 75
End: 2020-07-24

## 2020-07-24 NOTE — TELEPHONE ENCOUNTER
Provider Call: General  Route to LPN    Reason for call: [facility] in [place] need a form signed in order to bill Medicare for Prednisone and Azathioprine.  Faxed form to [phone].  Requesting form signed and faxed back as soon as possible.     Call back needed? Yes    Return Call Needed  Same as documented in contacts section if any questions  When to return call?: Same day: Route High Priority

## 2020-07-24 NOTE — TELEPHONE ENCOUNTER
Call returned to [facility] with Yuki, Awaiting CMN form.

## 2020-08-03 ENCOUNTER — TELEPHONE (OUTPATIENT)
Dept: TRANSPLANT | Facility: CLINIC | Age: 75
End: 2020-08-03

## 2020-08-03 NOTE — TELEPHONE ENCOUNTER
Provider Call: General  Route to LPN    Reason for call: They received Medicare- Medical Necessity  from back  Not completely filled out  They are faxing a new form  Missing last time was IDC code and date of transplant     Call back needed? No

## 2020-10-12 ENCOUNTER — TELEPHONE (OUTPATIENT)
Dept: TRANSPLANT | Facility: CLINIC | Age: 75
End: 2020-10-12

## 2020-10-12 NOTE — TELEPHONE ENCOUNTER
Prior Authorization Specialty Medication Request    Medication/Dose:   azaTHIOprine (IMURAN) 50 MG tablet Take 0.5 tablets (25 mg) by mouth daily Take 1/2 tablet (25 MG) by mouth daily     predniSONE (DELTASONE) 5 MG tablet Take 0.5 tablets (2.5 mg) by mouth daily     ICD code (if different than what is on RX):  Z94.0  Previously Tried and Failed:      Important Lab Values:   Rationale:     Insurance Name: Medicare  Insurance ID: [id number]  nsurance Phone Number:     Pharmacy Information (if different than what is on RX)  Name:    Phone:

## 2020-11-10 DIAGNOSIS — Z94.0 KIDNEY REPLACED BY TRANSPLANT: Primary | ICD-10-CM

## 2020-11-10 RX ORDER — PREDNISONE 5 MG/1
2.5 TABLET ORAL DAILY
Qty: 45 TABLET | Refills: 0 | Status: SHIPPED | OUTPATIENT
Start: 2020-11-10 | End: 2021-05-02

## 2020-11-11 DIAGNOSIS — Z94.0 KIDNEY REPLACED BY TRANSPLANT: Primary | ICD-10-CM

## 2020-11-11 RX ORDER — AZATHIOPRINE 50 MG/1
25 TABLET ORAL DAILY
Qty: 45 TABLET | Refills: 0 | Status: SHIPPED | OUTPATIENT
Start: 2020-11-11

## 2020-11-11 NOTE — TELEPHONE ENCOUNTER
[facility] Call Center    Phone Message    May a detailed message be left on voicemail: yes     Reason for Call: Medication Refill Request    Has the patient contacted the pharmacy for the refill? Yes   Name of medication being requested: azaTHIOprine (IMURAN) 50 MG tablet   Provider who prescribed the medication: Dr. Capellan  Pharmacy: [facility] [address] [place] [address]  Date medication is needed: asap         Action Taken: Message routed to:  [facility] neph    Travel Screening: Not Applicable

## 2020-12-14 ENCOUNTER — TELEPHONE (OUTPATIENT)
Dept: TRANSPLANT | Facility: CLINIC | Age: 75
End: 2020-12-14

## 2020-12-14 NOTE — TELEPHONE ENCOUNTER
Patient Call:  Had a heart attack last week  Wanted to go over his medication and make sure his new medication are ok to take       Call back needed? Yes    Return Call Needed  Same as documented in contacts section  When to return call?: Same day: Route High Priority

## 2020-12-14 NOTE — TELEPHONE ENCOUNTER
Patient called stated he has some questions regarding medications and to heart issues patient would like a return call today.

## 2020-12-14 NOTE — TELEPHONE ENCOUNTER
Call returned to Didier Brooks. He reports recent MI with stent placement. Cardiology is recommending he start lipid lowering agent. Didier would like to know which medications are advised by transplant team. Will review with pharmacy.

## 2020-12-16 ENCOUNTER — TELEPHONE (OUTPATIENT)
Dept: NEPHROLOGY | Facility: CLINIC | Age: 75
End: 2020-12-16

## 2020-12-16 NOTE — TELEPHONE ENCOUNTER
MTM referral from: [facility] visit (referral by provider)    MTM referral outreach attempt #2 on December 16, 2020 at 5:00 PM      Outcome: Patient not reachable after several attempts, will route to MTM Pharmacist/Provider as an FYI. Thank you for the referral.    Chris Weiss, MTM coordinator

## 2021-03-20 ENCOUNTER — HEALTH MAINTENANCE LETTER (OUTPATIENT)
Age: 76
End: 2021-03-20

## 2021-05-01 DIAGNOSIS — Z94.0 KIDNEY REPLACED BY TRANSPLANT: Primary | ICD-10-CM

## 2021-05-03 RX ORDER — PREDNISONE 5 MG/1
2.5 TABLET ORAL DAILY
Qty: 45 TABLET | Refills: 0 | Status: SHIPPED | OUTPATIENT
Start: 2021-05-03 | End: 2021-08-04

## 2021-06-03 ENCOUNTER — VIRTUAL VISIT (OUTPATIENT)
Dept: NEPHROLOGY | Facility: CLINIC | Age: 76
End: 2021-06-03
Attending: INTERNAL MEDICINE
Payer: MEDICARE

## 2021-06-03 DIAGNOSIS — N18.31 STAGE 3A CHRONIC KIDNEY DISEASE (H): ICD-10-CM

## 2021-06-03 DIAGNOSIS — I15.1 HTN, KIDNEY TRANSPLANT RELATED: ICD-10-CM

## 2021-06-03 DIAGNOSIS — Z94.0 KIDNEY REPLACED BY TRANSPLANT: ICD-10-CM

## 2021-06-03 DIAGNOSIS — Z48.298 AFTERCARE FOLLOWING ORGAN TRANSPLANT: ICD-10-CM

## 2021-06-03 DIAGNOSIS — D84.9 IMMUNOSUPPRESSION (H): ICD-10-CM

## 2021-06-03 DIAGNOSIS — E78.5 DYSLIPIDEMIA: Primary | ICD-10-CM

## 2021-06-03 DIAGNOSIS — E55.9 VITAMIN D DEFICIENCY: ICD-10-CM

## 2021-06-03 DIAGNOSIS — Z94.0 HTN, KIDNEY TRANSPLANT RELATED: ICD-10-CM

## 2021-06-03 PROBLEM — N18.30 CHRONIC KIDNEY DISEASE, STAGE 3 (H): Status: ACTIVE | Noted: 2021-06-03

## 2021-06-03 PROCEDURE — 99214 OFFICE O/P EST MOD 30 MIN: CPT | Mod: 95 | Performed by: INTERNAL MEDICINE

## 2021-06-03 RX ORDER — ATORVASTATIN CALCIUM 10 MG/1
10 TABLET, FILM COATED ORAL DAILY
Qty: 90 TABLET | Refills: 3 | Status: SHIPPED | OUTPATIENT
Start: 2021-06-03 | End: 2022-06-17

## 2021-06-03 RX ORDER — CLOPIDOGREL BISULFATE 75 MG/1
75 TABLET ORAL
COMMUNITY
Start: 2020-12-16 | End: 2022-06-17

## 2021-06-03 ASSESSMENT — PAIN SCALES - GENERAL: PAINLEVEL: NO PAIN (0)

## 2021-06-03 NOTE — PROGRESS NOTES
Didier is a 76 year old who is being evaluated via a billable video visit.      How would you like to obtain your AVS? Mail a copy  If the video visit is dropped, the invitation should be resent by: Send to e-mail at: [email or web address]  Will anyone else be joining your video visit? No    Video Start Time: 1338  Video-Visit Details    Type of service:  Video Visit    Video End Time:1408    Originating Location (pt. Location): Home    Distant Location (provider location):  [facility] [place]     Platform used for Video Visit: [employer]      CHRONIC TRANSPLANT NEPHROLOGY VISIT    Assessment & Plan   # LDKT: Stable   - Baseline Creatinine:  ~ 1.3-1.4   - Proteinuria: Minimal (0.2-0.5 grams)   - Date DSA Last Checked: Not Known      Latest DSA: Not checked recently due to time from transplant   - BK Viremia: Not checked recently due to time from transplant   - Kidney Tx Biopsy: No    # Immunosuppression: Azathioprine (dose 12.5 mg daily) and Prednisone (dose 2.5 mg daily)   - Continue with intensive monitoring of immunosuppression for efficacy and toxicity.   - Changes: No; Patient reports self tapering down to this level of immunosuppression and has been stable on it for over 15 years.    # Infection Prophylaxis:   Last CD4 Level: Not checked  - PJP: None    # Hypertension: Controlled;  Goal BP: < 130/80   - Changes: No    # Mineral Bone Disorder:   - Vitamin D; level: Not checked recently        On supplement: No  - Calcium; level: Normal        On supplement: No    # CAD, s/p PCI: Patient had recent admission for NSTEMI and underwent PCI to RCA.  Asymptomatic now.  It was recommended that patient start statin, but he has been reluctant.  Discussed risk/benefit of primary prevention for cardiac disease.   - Will start atorvastatin 10 mg daily.    # BPH: Slow urinary stream, but reports no issues emptying his bladder on tamsulosin.    # Skin Cancer: New lesions: a few   - Discussed sun  protection and recommend regular follow up with Dermatology.    # Medical Compliance: No.  Evidence of labs less than recommended.  - Discussed importance of checking labs regularly as recommended, taking medications as prescribed and attending scheduled medical appointments.    # COVID-19 Virus Review: Discussed COVID-19 virus and the potential medical risks.  Reviewed preventative health recommendations, which includes washing hands for 20 seconds, avoid touching your face, and social distancing.  Asked patient to inform the transplant center if they are exposed or diagnosed with this virus.    # COVID Vaccine Completed: Yes     # Transplant History:  Etiology of Kidney Failure: Unknown etiology  Tx: LDKT  Transplant: 9/16/1981 (Kidney)  Significant changes in immunosuppression: Self tapered immunosuppression by patient over many years.  Significant transplant-related complications: None    Transplant Office Phone Number: [phone]    Assessment and plan was discussed with the patient and he voiced his understanding and agreement.    Return visit: Return in about 1 year (around 6/3/2022).    Bart Capellan MD    Chief Complaint   Mr. Brooks is a 76 year old here for kidney transplant and immunosuppression management.    History of Present Illness    Mr. Broosk reports feeling good overall with some medical complaints.  He was admitted to the hospital in December with chest pain and found to have an NSTEMI.  He underwent a coronary angiogram and had PCI to RCA done.  Patient denies any chest pain or shortness of breath with exertion.  His energy level is good and remains normal to even better after his cardiac intervention.  He is active and gets some exercise, mostly with walking daily.  No leg swelling.    Appetite is good and he has lost about 20 lbs by eating healthier.  No nausea, vomiting or diarrhea.  No fever, sweats or chills.  No problems emptying his bladder, but can have a slow stream.    Home  BP: 100/60s with rare lightheadedness    Problem List   Patient Active Problem List   Diagnosis     Kidney replaced by transplant     Immunosuppression (H)     HTN, kidney transplant related     Dyslipidemia     BPH (benign prostatic hyperplasia)     Arthritis     Skin cancer     Aftercare following organ transplant     Chronic kidney disease, stage 3     Vitamin D deficiency     CAD (coronary artery disease)       Allergies   No Known Allergies    Medications   Current Outpatient Medications   Medication Sig     amLODIPine (NORVASC) 10 MG tablet Take 1 tablet (10 mg) by mouth At Bedtime     atorvastatin (LIPITOR) 10 MG tablet Take 1 tablet (10 mg) by mouth daily     azaTHIOprine (IMURAN) 50 MG tablet Take 0.5 tablets (25 mg) by mouth daily Take 1/2 tablet (25 MG) by mouth daily     clopidogrel (PLAVIX) 75 MG tablet Take 75 mg by mouth     hydrochlorothiazide (HYDRODIURIL) 12.5 MG tablet Take 1 tablet (12.5 mg) by mouth daily     metoprolol tartrate (LOPRESSOR) 25 MG tablet Take 1 tablet (25 mg) by mouth 2 times daily     predniSONE (DELTASONE) 5 MG tablet Take 0.5 tablets (2.5 mg) by mouth daily     tamsulosin (FLOMAX) 0.4 MG capsule Take 2 capsules (0.8 mg) by mouth daily     tadalafil (CIALIS) 5 MG tablet Take 1 tablet (5 mg) by mouth daily Never use with nitroglycerin, terazosin or doxazosin. (Patient not taking: Reported on 5/31/2019)     No current facility-administered medications for this visit.      There are no discontinued medications.    Physical Exam   Vital Signs: Deferred for this telemedicine visit.    GENERAL APPEARANCE: alert and no distress  HENT: no obvious abnormalities on appearance  RESP: breathing appears unremarkable with normal rate, no audible wheezing or cough and no apparent shortness of breath with conversation  MS: extremities normal - no gross deformities noted  SKIN: no apparent rash and normal skin tone  NEURO: speech is clear with no obvious neurological deficits  PSYCH: mentation  appears normal and affect normal    Data     Renal Latest Ref Rng & Units 3/8/2019 11/21/2016 8/3/2015   Na (external) 136 - 145 mmol/L 139 140 141   K (external) 3.5 - 5.1 mmol/L 3.3(L) 3.7 3.9   Cl (external) 101 - 109 mmol/L 104 99(L) 102   CO2 (external) 25 - 33 mmol/L 28 30 29   BUN (external) 8 - 26 mg/dl 34(H) 19 24   Cr (external) 0.7 - 1.3 mg/dl 1.4(H) 1.3 1.3   Glucose (external) 70 - 110 mg/dl 91 90 86   Ca (external) 8.4 - 10.2 mg/dl 8.6 9.4 9.1        Heme Latest Ref Rng & Units 3/8/2019 11/21/2016   WBC (external) 4.0 - 11.0 10:3 7.5 7.9   Hgb (external) 13.5 - 17.5 g/dl 15.8 16.7   Plt (external) 150 - 450 10:3 327 343   ABSOLUTE NEUTROPHILS (EXTERNAL) 1.6 - 8.3 10:3 4.2 -   ABSOLUTE LYMPHOCYTES (EXTERNAL) 0.6 - 5.0 10:3 2.1 -   ABSOLUTE MONOCYTES (EXTERNAL) 0.0 - 1.3 10:3 0.9 -   ABSOLUTE EOSINOPHILS (EXTERNAL) 0.0 - 0.8 10:3 0.2 -   ABSOLUTE BASOPHILS (EXTERNAL) 0.0 - 0.2 10:3 0.0 -     Liver Latest Ref Rng & Units 3/8/2019 11/21/2016 8/3/2015   AP (external) 40 - 150 u/l 91 85 89   TBili (external) 0.2 - 1.2 mg/dl 1.0 1.3(H) 1.0   ALT (external) 0 - 55 u/l 27 38 25   AST (external) 5 - 34 u/l 31 27 30   Tot Protein (external) 6.4 - 8.3 g/dl 7.0 6.7 6.3(L)   Albumin (external) 3.5 - 5.0 g/dl 4.0 4.1 3.4(L)

## 2021-06-03 NOTE — LETTER
6/3/2021      RE: Didier Brooks  [address] [place], [place] [id number]       Didier is a 76 year old who is being evaluated via a billable video visit.      How would you like to obtain your AVS? Mail a copy  If the video visit is dropped, the invitation should be resent by: Send to e-mail at: [email or web address]  Will anyone else be joining your video visit? No    Video Start Time: 1338  Video-Visit Details    Type of service:  Video Visit    Video End Time:1408    Originating Location (pt. Location): Home    Distant Location (provider location):  [facility] NEPHROLOGY [facility] [place]     Platform used for Video Visit: [employer]      CHRONIC TRANSPLANT NEPHROLOGY VISIT    Assessment & Plan   # LDKT: Stable   - Baseline Creatinine:  ~ 1.3-1.4   - Proteinuria: Minimal (0.2-0.5 grams)   - Date DSA Last Checked: Not Known      Latest DSA: Not checked recently due to time from transplant   - BK Viremia: Not checked recently due to time from transplant   - Kidney Tx Biopsy: No    # Immunosuppression: Azathioprine (dose 12.5 mg daily) and Prednisone (dose 2.5 mg daily)   - Continue with intensive monitoring of immunosuppression for efficacy and toxicity.   - Changes: No; Patient reports self tapering down to this level of immunosuppression and has been stable on it for over 15 years.    # Infection Prophylaxis:   Last CD4 Level: Not checked  - PJP: None    # Hypertension: Controlled;  Goal BP: < 130/80   - Changes: No    # Mineral Bone Disorder:   - Vitamin D; level: Not checked recently        On supplement: No  - Calcium; level: Normal        On supplement: No    # CAD, s/p PCI: Patient had recent admission for NSTEMI and underwent PCI to RCA.  Asymptomatic now.  It was recommended that patient start statin, but he has been reluctant.  Discussed risk/benefit of primary prevention for cardiac disease.   - Will start atorvastatin 10 mg daily.    # BPH: Slow urinary stream, but reports no issues emptying his  bladder on tamsulosin.    # Skin Cancer: New lesions: a few   - Discussed sun protection and recommend regular follow up with Dermatology.    # Medical Compliance: No.  Evidence of labs less than recommended.  - Discussed importance of checking labs regularly as recommended, taking medications as prescribed and attending scheduled medical appointments.    # COVID-19 Virus Review: Discussed COVID-19 virus and the potential medical risks.  Reviewed preventative health recommendations, which includes washing hands for 20 seconds, avoid touching your face, and social distancing.  Asked patient to inform the transplant center if they are exposed or diagnosed with this virus.    # COVID Vaccine Completed: Yes     # Transplant History:  Etiology of Kidney Failure: Unknown etiology  Tx: LDKT  Transplant: 9/16/1981 (Kidney)  Significant changes in immunosuppression: Self tapered immunosuppression by patient over many years.  Significant transplant-related complications: None    Transplant Office Phone Number: [phone]    Assessment and plan was discussed with the patient and he voiced his understanding and agreement.    Return visit: Return in about 1 year (around 6/3/2022).    Bart Capellan MD    Chief Complaint   Mr. Brooks is a 76 year old here for kidney transplant and immunosuppression management.    History of Present Illness    Mr. Brooks reports feeling good overall with some medical complaints.  He was admitted to the hospital in December with chest pain and found to have an NSTEMI.  He underwent a coronary angiogram and had PCI to RCA done.  Patient denies any chest pain or shortness of breath with exertion.  His energy level is good and remains normal to even better after his cardiac intervention.  He is active and gets some exercise, mostly with walking daily.  No leg swelling.    Appetite is good and he has lost about 20 lbs by eating healthier.  No nausea, vomiting or diarrhea.  No fever, sweats or  chills.  No problems emptying his bladder, but can have a slow stream.    Home BP: 100/60s with rare lightheadedness    Problem List   Patient Active Problem List   Diagnosis     Kidney replaced by transplant     Immunosuppression (H)     HTN, kidney transplant related     Dyslipidemia     BPH (benign prostatic hyperplasia)     Arthritis     Skin cancer     Aftercare following organ transplant     Chronic kidney disease, stage 3     Vitamin D deficiency     CAD (coronary artery disease)       Allergies   No Known Allergies    Medications   Current Outpatient Medications   Medication Sig     amLODIPine (NORVASC) 10 MG tablet Take 1 tablet (10 mg) by mouth At Bedtime     atorvastatin (LIPITOR) 10 MG tablet Take 1 tablet (10 mg) by mouth daily     azaTHIOprine (IMURAN) 50 MG tablet Take 0.5 tablets (25 mg) by mouth daily Take 1/2 tablet (25 MG) by mouth daily     clopidogrel (PLAVIX) 75 MG tablet Take 75 mg by mouth     hydrochlorothiazide (HYDRODIURIL) 12.5 MG tablet Take 1 tablet (12.5 mg) by mouth daily     metoprolol tartrate (LOPRESSOR) 25 MG tablet Take 1 tablet (25 mg) by mouth 2 times daily     predniSONE (DELTASONE) 5 MG tablet Take 0.5 tablets (2.5 mg) by mouth daily     tamsulosin (FLOMAX) 0.4 MG capsule Take 2 capsules (0.8 mg) by mouth daily     tadalafil (CIALIS) 5 MG tablet Take 1 tablet (5 mg) by mouth daily Never use with nitroglycerin, terazosin or doxazosin. (Patient not taking: Reported on 5/31/2019)     No current facility-administered medications for this visit.      There are no discontinued medications.    Physical Exam   Vital Signs: Deferred for this telemedicine visit.    GENERAL APPEARANCE: alert and no distress  HENT: no obvious abnormalities on appearance  RESP: breathing appears unremarkable with normal rate, no audible wheezing or cough and no apparent shortness of breath with conversation  MS: extremities normal - no gross deformities noted  SKIN: no apparent rash and normal skin  tone  NEURO: speech is clear with no obvious neurological deficits  PSYCH: mentation appears normal and affect normal    Data     Renal Latest Ref Rng & Units 3/8/2019 11/21/2016 8/3/2015   Na (external) 136 - 145 mmol/L 139 140 141   K (external) 3.5 - 5.1 mmol/L 3.3(L) 3.7 3.9   Cl (external) 101 - 109 mmol/L 104 99(L) 102   CO2 (external) 25 - 33 mmol/L 28 30 29   BUN (external) 8 - 26 mg/dl 34(H) 19 24   Cr (external) 0.7 - 1.3 mg/dl 1.4(H) 1.3 1.3   Glucose (external) 70 - 110 mg/dl 91 90 86   Ca (external) 8.4 - 10.2 mg/dl 8.6 9.4 9.1        Heme Latest Ref Rng & Units 3/8/2019 11/21/2016   WBC (external) 4.0 - 11.0 10:3 7.5 7.9   Hgb (external) 13.5 - 17.5 g/dl 15.8 16.7   Plt (external) 150 - 450 10:3 327 343   ABSOLUTE NEUTROPHILS (EXTERNAL) 1.6 - 8.3 10:3 4.2 -   ABSOLUTE LYMPHOCYTES (EXTERNAL) 0.6 - 5.0 10:3 2.1 -   ABSOLUTE MONOCYTES (EXTERNAL) 0.0 - 1.3 10:3 0.9 -   ABSOLUTE EOSINOPHILS (EXTERNAL) 0.0 - 0.8 10:3 0.2 -   ABSOLUTE BASOPHILS (EXTERNAL) 0.0 - 0.2 10:3 0.0 -     Liver Latest Ref Rng & Units 3/8/2019 11/21/2016 8/3/2015   AP (external) 40 - 150 u/l 91 85 89   TBili (external) 0.2 - 1.2 mg/dl 1.0 1.3(H) 1.0   ALT (external) 0 - 55 u/l 27 38 25   AST (external) 5 - 34 u/l 31 27 30   Tot Protein (external) 6.4 - 8.3 g/dl 7.0 6.7 6.3(L)   Albumin (external) 3.5 - 5.0 g/dl 4.0 4.1 3.4(L)       Bart Capellan MD

## 2021-06-03 NOTE — LETTER
6/3/2021       RE: Didier Brooks  [address] [address]     Dear Colleague,    Thank you for referring your patient, Didier Brooks, to the [facility] NEPHROLOGY [facility]INIC [place] at [facility]. Please see a copy of my visit note below.    Didier is a 76 year old who is being evaluated via a billable video visit.      How would you like to obtain your AVS? Mail a copy  If the video visit is dropped, the invitation should be resent by: Send to e-mail at: [email or web address]  Will anyone else be joining your video visit? No    Video Start Time: 1338  Video-Visit Details    Type of service:  Video Visit    Video End Time:1408    Originating Location (pt. Location): Home    Distant Location (provider location):  [facility] NEPHROLOGY [facility] [place]     Platform used for Video Visit: [employer]      CHRONIC TRANSPLANT NEPHROLOGY VISIT    Assessment & Plan   # LDKT: Stable   - Baseline Creatinine:  ~ 1.3-1.4   - Proteinuria: Minimal (0.2-0.5 grams)   - Date DSA Last Checked: Not Known      Latest DSA: Not checked recently due to time from transplant   - BK Viremia: Not checked recently due to time from transplant   - Kidney Tx Biopsy: No    # Immunosuppression: Azathioprine (dose 12.5 mg daily) and Prednisone (dose 2.5 mg daily)   - Continue with intensive monitoring of immunosuppression for efficacy and toxicity.   - Changes: No; Patient reports self tapering down to this level of immunosuppression and has been stable on it for over 15 years.    # Infection Prophylaxis:   Last CD4 Level: Not checked  - PJP: None    # Hypertension: Controlled;  Goal BP: < 130/80   - Changes: No    # Mineral Bone Disorder:   - Vitamin D; level: Not checked recently        On supplement: No  - Calcium; level: Normal        On supplement: No    # CAD, s/p PCI: Patient had recent admission for NSTEMI and underwent PCI to RCA.  Asymptomatic now.  It was  recommended that patient start statin, but he has been reluctant.  Discussed risk/benefit of primary prevention for cardiac disease.   - Will start atorvastatin 10 mg daily.    # BPH: Slow urinary stream, but reports no issues emptying his bladder on tamsulosin.    # Skin Cancer: New lesions: a few   - Discussed sun protection and recommend regular follow up with Dermatology.    # Medical Compliance: No.  Evidence of labs less than recommended.  - Discussed importance of checking labs regularly as recommended, taking medications as prescribed and attending scheduled medical appointments.    # COVID-19 Virus Review: Discussed COVID-19 virus and the potential medical risks.  Reviewed preventative health recommendations, which includes washing hands for 20 seconds, avoid touching your face, and social distancing.  Asked patient to inform the transplant center if they are exposed or diagnosed with this virus.    # COVID Vaccine Completed: Yes     # Transplant History:  Etiology of Kidney Failure: Unknown etiology  Tx: LDKT  Transplant: 9/16/1981 (Kidney)  Significant changes in immunosuppression: Self tapered immunosuppression by patient over many years.  Significant transplant-related complications: None    Transplant Office Phone Number: [phone]    Assessment and plan was discussed with the patient and he voiced his understanding and agreement.    Return visit: Return in about 1 year (around 6/3/2022).    Bart Capellan MD    Chief Complaint   Mr. Brooks is a 76 year old here for kidney transplant and immunosuppression management.    History of Present Illness    Mr. Brooks reports feeling good overall with some medical complaints.  He was admitted to the hospital in December with chest pain and found to have an NSTEMI.  He underwent a coronary angiogram and had PCI to RCA done.  Patient denies any chest pain or shortness of breath with exertion.  His energy level is good and remains normal to even better  after his cardiac intervention.  He is active and gets some exercise, mostly with walking daily.  No leg swelling.    Appetite is good and he has lost about 20 lbs by eating healthier.  No nausea, vomiting or diarrhea.  No fever, sweats or chills.  No problems emptying his bladder, but can have a slow stream.    Home BP: 100/60s with rare lightheadedness    Problem List   Patient Active Problem List   Diagnosis     Kidney replaced by transplant     Immunosuppression (H)     HTN, kidney transplant related     Dyslipidemia     BPH (benign prostatic hyperplasia)     Arthritis     Skin cancer     Aftercare following organ transplant     Chronic kidney disease, stage 3     Vitamin D deficiency     CAD (coronary artery disease)       Allergies   No Known Allergies    Medications   Current Outpatient Medications   Medication Sig     amLODIPine (NORVASC) 10 MG tablet Take 1 tablet (10 mg) by mouth At Bedtime     atorvastatin (LIPITOR) 10 MG tablet Take 1 tablet (10 mg) by mouth daily     azaTHIOprine (IMURAN) 50 MG tablet Take 0.5 tablets (25 mg) by mouth daily Take 1/2 tablet (25 MG) by mouth daily     clopidogrel (PLAVIX) 75 MG tablet Take 75 mg by mouth     hydrochlorothiazide (HYDRODIURIL) 12.5 MG tablet Take 1 tablet (12.5 mg) by mouth daily     metoprolol tartrate (LOPRESSOR) 25 MG tablet Take 1 tablet (25 mg) by mouth 2 times daily     predniSONE (DELTASONE) 5 MG tablet Take 0.5 tablets (2.5 mg) by mouth daily     tamsulosin (FLOMAX) 0.4 MG capsule Take 2 capsules (0.8 mg) by mouth daily     tadalafil (CIALIS) 5 MG tablet Take 1 tablet (5 mg) by mouth daily Never use with nitroglycerin, terazosin or doxazosin. (Patient not taking: Reported on 5/31/2019)     No current facility-administered medications for this visit.      There are no discontinued medications.    Physical Exam   Vital Signs: Deferred for this telemedicine visit.    GENERAL APPEARANCE: alert and no distress  HENT: no obvious abnormalities on  appearance  RESP: breathing appears unremarkable with normal rate, no audible wheezing or cough and no apparent shortness of breath with conversation  MS: extremities normal - no gross deformities noted  SKIN: no apparent rash and normal skin tone  NEURO: speech is clear with no obvious neurological deficits  PSYCH: mentation appears normal and affect normal    Data     Renal Latest Ref Rng & Units 3/8/2019 11/21/2016 8/3/2015   Na (external) 136 - 145 mmol/L 139 140 141   K (external) 3.5 - 5.1 mmol/L 3.3(L) 3.7 3.9   Cl (external) 101 - 109 mmol/L 104 99(L) 102   CO2 (external) 25 - 33 mmol/L 28 30 29   BUN (external) 8 - 26 mg/dl 34(H) 19 24   Cr (external) 0.7 - 1.3 mg/dl 1.4(H) 1.3 1.3   Glucose (external) 70 - 110 mg/dl 91 90 86   Ca (external) 8.4 - 10.2 mg/dl 8.6 9.4 9.1        Heme Latest Ref Rng & Units 3/8/2019 11/21/2016   WBC (external) 4.0 - 11.0 10:3 7.5 7.9   Hgb (external) 13.5 - 17.5 g/dl 15.8 16.7   Plt (external) 150 - 450 10:3 327 343   ABSOLUTE NEUTROPHILS (EXTERNAL) 1.6 - 8.3 10:3 4.2 -   ABSOLUTE LYMPHOCYTES (EXTERNAL) 0.6 - 5.0 10:3 2.1 -   ABSOLUTE MONOCYTES (EXTERNAL) 0.0 - 1.3 10:3 0.9 -   ABSOLUTE EOSINOPHILS (EXTERNAL) 0.0 - 0.8 10:3 0.2 -   ABSOLUTE BASOPHILS (EXTERNAL) 0.0 - 0.2 10:3 0.0 -     Liver Latest Ref Rng & Units 3/8/2019 11/21/2016 8/3/2015   AP (external) 40 - 150 u/l 91 85 89   TBili (external) 0.2 - 1.2 mg/dl 1.0 1.3(H) 1.0   ALT (external) 0 - 55 u/l 27 38 25   AST (external) 5 - 34 u/l 31 27 30   Tot Protein (external) 6.4 - 8.3 g/dl 7.0 6.7 6.3(L)   Albumin (external) 3.5 - 5.0 g/dl 4.0 4.1 3.4(L)            Again, thank you for allowing me to participate in the care of your patient.      Sincerely,    Bart Capellan MD

## 2021-06-09 PROBLEM — I25.10 CAD (CORONARY ARTERY DISEASE): Status: ACTIVE | Noted: 2021-06-09

## 2021-06-09 PROBLEM — E55.9 VITAMIN D DEFICIENCY: Status: ACTIVE | Noted: 2021-06-09

## 2021-06-09 NOTE — PATIENT INSTRUCTIONS
Recommend obtaining routine transplant labs every 3 months.    Recommend follow up with Cardiology.

## 2021-06-10 ENCOUNTER — TELEPHONE (OUTPATIENT)
Dept: TRANSPLANT | Facility: CLINIC | Age: 76
End: 2021-06-10

## 2021-06-10 NOTE — LETTER
PHYSICIAN ORDERS      DATE & TIME ISSUED: Jeannette 10, 2021 11:22 AM  PATIENT NAME: Didier Brooks   : 1945     [facility] MR# [if applicable]: [id number]     DIAGNOSIS:  Kidney transplant   ICD-10 CODE: Z94.0      Please complete the following labs in 3 months with next lab draw  Fasting lipid and hepatic panel    Any questions please call: [phone] option 5    Please fax results to [phone].    .

## 2021-07-26 ENCOUNTER — TELEPHONE (OUTPATIENT)
Dept: TRANSPLANT | Facility: CLINIC | Age: 76
End: 2021-07-26

## 2021-07-26 NOTE — TELEPHONE ENCOUNTER
Patient Call: General  Route to LPN    Reason for call: pt stop taking atorvastatin (LIPITOR) 10 MG tablet because of the stomach pains     Call back needed? Yes    Return Call Needed  Same as documented in contacts section  When to return call?: Greater than one day: Route standard priority

## 2021-07-27 NOTE — TELEPHONE ENCOUNTER
Call placed to patient. No answer. Voice message left with instructions listed below. Mychart message sent.

## 2021-07-27 NOTE — TELEPHONE ENCOUNTER
# CAD, s/p PCI: Patient had recent admission for NSTEMI and underwent PCI to RCA.  Asymptomatic now.  It was recommended that patient start statin, but he has been reluctant.  Discussed risk/benefit of primary prevention for cardiac disease.              - Will start atorvastatin 10 mg daily.    Lipitor started by Dr. Capellan, please advise patient he needs to follow up with primary care or cardiology to manage coronary artery disease.

## 2021-08-03 DIAGNOSIS — Z94.0 KIDNEY REPLACED BY TRANSPLANT: Primary | ICD-10-CM

## 2021-08-04 RX ORDER — PREDNISONE 5 MG/1
2.5 TABLET ORAL DAILY
Qty: 45 TABLET | Refills: 3 | Status: SHIPPED | OUTPATIENT
Start: 2021-08-04 | End: 2023-06-16

## 2021-09-04 ENCOUNTER — HEALTH MAINTENANCE LETTER (OUTPATIENT)
Age: 76
End: 2021-09-04

## 2022-04-16 ENCOUNTER — HEALTH MAINTENANCE LETTER (OUTPATIENT)
Age: 77
End: 2022-04-16

## 2022-05-16 ENCOUNTER — TELEPHONE (OUTPATIENT)
Dept: NEPHROLOGY | Facility: CLINIC | Age: 77
End: 2022-05-16
Payer: MEDICARE

## 2022-05-16 ENCOUNTER — TELEPHONE (OUTPATIENT)
Dept: TRANSPLANT | Facility: CLINIC | Age: 77
End: 2022-05-16
Payer: MEDICARE

## 2022-05-16 DIAGNOSIS — Z94.0 KIDNEY REPLACED BY TRANSPLANT: Primary | ICD-10-CM

## 2022-05-16 NOTE — TELEPHONE ENCOUNTER
[facility] Call Center    Phone Message    May a detailed message be left on voicemail: yes     Reason for Call: Patient wanting to scheduled an appt with Dr. Capellan in [place]. Writer unable to reach Steph Huertas. Please reach out to patient. Thank you    Action Taken: Message routed to:  [facility] ([facility] Neph    Travel Screening: Not Applicable

## 2022-05-16 NOTE — TELEPHONE ENCOUNTER
Returned a call to Didier. Left a VM letting him know Dr. Capellan is in [place] every 3rd Friday-he does have availability on Friday, June 17. Advised Didier to call SOT if he would like to schedule.  Recommended he look into Dr. Guevara if he would like to be followed locally in [place].        ADDENDUM:  Received a call back from Didier. Discussed above. Order placed for follow up appointment. Didier will call to schedule.

## 2022-05-16 NOTE — LETTER
PHYSICIAN ORDERS    DATE & TIME ISSUED: 2022  8:54 AM  PATIENT NAME: Didier Brooks   : 1945     [facility] MR# [if applicable]: [id number]     DIAGNOSIS / ICD - 10 CODES    Kidney Transplanted (Z94.0)    After Care Following Organ Transplant (Z48.298)    Long Term Use of Medication (Z79.899)    Complications Kidney Transplant (T86.10)    * Do not draw Drug levels.     Every 3 months    Hemogram and Platelet    Basic Metabolic Panel (Sodium,potassium,chloride,CO2,creatinine,urea,nitrogen,glucose,calcium)    Every 6 months    Urine for protein creatinine ratio      Patient should release information to the [facility].   Please fax results to the Transplant Center at [phone].  Any questions please call [phone] or [phone].        .

## 2022-05-16 NOTE — TELEPHONE ENCOUNTER
Patient Call: General  Route to LPN    Reason for call: Patient called with questions about Dr. Capellan hospital rotation in [place] and if there's another [facility] in [place] that works with the [facility]. He would like a call back regarding this.    Call back needed? Yes    Return Call Needed  Same as documented in contacts section  When to return call?: Greater than one day: Route standard priority

## 2022-06-01 NOTE — TELEPHONE ENCOUNTER
Annual post-transplant standing lab requisition letter sent to patient and preferred lab on file.     Maria D Sam RN, BSN  Solid Organ Transplant, Post Kidney and Pancreas  Transplant Care Coordinator  [phone]

## 2022-06-17 ENCOUNTER — OFFICE VISIT (OUTPATIENT)
Dept: NEPHROLOGY | Facility: CLINIC | Age: 77
End: 2022-06-17
Payer: MEDICARE

## 2022-06-17 VITALS
SYSTOLIC BLOOD PRESSURE: 114 MMHG | DIASTOLIC BLOOD PRESSURE: 68 MMHG | WEIGHT: 168.9 LBS | HEIGHT: 70 IN | HEART RATE: 80 BPM | BODY MASS INDEX: 24.18 KG/M2

## 2022-06-17 DIAGNOSIS — Z94.0 KIDNEY REPLACED BY TRANSPLANT: Primary | ICD-10-CM

## 2022-06-17 DIAGNOSIS — D84.9 IMMUNOSUPPRESSION (H): ICD-10-CM

## 2022-06-17 DIAGNOSIS — N40.0 BENIGN PROSTATIC HYPERPLASIA, UNSPECIFIED WHETHER LOWER URINARY TRACT SYMPTOMS PRESENT: ICD-10-CM

## 2022-06-17 DIAGNOSIS — I15.1 HTN, KIDNEY TRANSPLANT RELATED: ICD-10-CM

## 2022-06-17 DIAGNOSIS — Z94.0 HTN, KIDNEY TRANSPLANT RELATED: ICD-10-CM

## 2022-06-17 DIAGNOSIS — Z48.298 AFTERCARE FOLLOWING ORGAN TRANSPLANT: ICD-10-CM

## 2022-06-17 PROCEDURE — 99214 OFFICE O/P EST MOD 30 MIN: CPT | Performed by: INTERNAL MEDICINE

## 2022-06-17 RX ORDER — HYDROCHLOROTHIAZIDE 25 MG/1
25 TABLET ORAL DAILY
COMMUNITY
Start: 2022-06-17

## 2022-06-17 NOTE — PROGRESS NOTES
CHRONIC TRANSPLANT NEPHROLOGY VISIT    Assessment & Plan   # LDKT: Increased creatinine slightly to ~ 1.6 with last couple of checks.  This could be partly due to taking a higher dose of diuretic, as well as sinus infection with last check.  Doubt acute rejection this far out, although patient is on very low immunosuppression.  Will follow.   - Baseline Creatinine:  ~ 1.3-1.4   - Proteinuria: Minimal (0.2-0.5 grams)   - Date DSA Last Checked: Not Known      Latest DSA: Not checked recently due to time from transplant   - BK Viremia: Not checked recently due to time from transplant   - Kidney Tx Biopsy: No    # Immunosuppression: Azathioprine (dose 12.5 mg daily) and Prednisone (dose 2.5 mg daily)    - Patient reports self tapering down to this level of immunosuppression and has been stable on it for over 15+ years.   - Continue with intensive monitoring of immunosuppression for efficacy and toxicity.   - Changes: No    # Infection Prophylaxis:   Last CD4 Level: Not checked  - PJP: None    # Hypertension: Controlled;  Goal BP: < 130/80   - Changes: No    # Mineral Bone Disorder:   - Vitamin D; level: Not checked recently        On supplement: No  - Calcium; level: Normal        On supplement: No    # CAD, s/p PCI: Asymptomatic.    # BPH: Slow urinary stream, but reports no obvious issues emptying his bladder on tamsulosin.  Cannot completely rule out UTI with urinary urgency.   - Would recommend follow up with Urology.   - Will check urinalysis to rule out UTI.    # TANIKA on CPAP: Patient is compliant.    # Leukocytosis: Slightly elevated WBC at 10K with last check, but this was during sinusitis episode.   - Recommend rechecking sooner than routine.    # Skin Cancer: New lesions: none   - Discussed sun protection and recommend regular follow up with Dermatology.    # Medical Compliance: No.  Evidence of labs less than recommended.  - Discussed importance of checking labs regularly as recommended, taking medications as  prescribed and attending scheduled medical appointments.    # COVID-19 Virus Review: Discussed COVID-19 virus and the potential medical risks.  Reviewed preventative health recommendations, including wearing a mask where appropriate.  Recommended COVID vaccination should be up to date with either an initial vaccination or booster shot when appropriate.  Asked the patient to inform the transplant center if they are exposed or diagnosed with this virus.    # COVID Vaccination Up To Date: Yes    # Transplant History:  Etiology of Kidney Failure: Unknown etiology  Tx: LDKT  Transplant: 9/16/1981 (Kidney)  Significant changes in immunosuppression: Self tapered immunosuppression by patient over many years.  Significant transplant-related complications: None    Transplant Office Phone Number: [phone]    Assessment and plan was discussed with the patient and he voiced his understanding and agreement.    Return visit: Return in about 1 year (around 6/17/2023).    Bart Capellan MD    Chief Complaint   Mr. Brooks is a 77 year old here for kidney transplant and immunosuppression management.    History of Present Illness    Mr. Brooks reports feeling good overall with some medical complaints.  Since last clinic visit, patient reports no hospitalizations or new medical complaints and has been doing well overall.  Patient was having sinus issues lately, but saw his PCP and started on antibiotics for sinusitis.  Symptoms have since resolved.    His energy is good and has remained normal.  He is active and does get some exercise.  Denies any chest pain or shortness of breath with exertion.  No leg swelling.    Appetite is good, but he is trying to eat less and healthier.  He has lost about 20 lbs.  No nausea, vomiting or diarrhea.  No fever, sweats or chills.  No night sweats.    Patient does report some ongoing issues with emptying his bladder.  His stream is slower and occasional urinary urgency.  No pain or burning  "with urination.  No blood in his urine.    Home BP: 120/70s with no lightheadedness    Problem List   Patient Active Problem List   Diagnosis     Kidney replaced by transplant     Immunosuppression (H)     HTN, kidney transplant related     Dyslipidemia     BPH (benign prostatic hyperplasia)     Arthritis     Skin cancer     Aftercare following organ transplant     Chronic kidney disease, stage 3 (H)     Vitamin D deficiency     CAD (coronary artery disease)       Allergies   No Known Allergies    Medications   Current Outpatient Medications   Medication Sig     amLODIPine (NORVASC) 10 MG tablet Take 1 tablet (10 mg) by mouth At Bedtime     azaTHIOprine (IMURAN) 50 MG tablet Take 0.5 tablets (25 mg) by mouth daily Take 1/2 tablet (25 MG) by mouth daily (Patient taking differently: Take 12.5 mg by mouth daily Take 1/2 tablet (25 MG) by mouth daily)     hydrochlorothiazide (HYDRODIURIL) 25 MG tablet Take 0.5 tablets (12.5 mg) by mouth daily     metoprolol tartrate (LOPRESSOR) 25 MG tablet Take 1 tablet (25 mg) by mouth 2 times daily     predniSONE (DELTASONE) 5 MG tablet Take 0.5 tablets (2.5 mg) by mouth daily     tamsulosin (FLOMAX) 0.4 MG capsule Take 2 capsules (0.8 mg) by mouth daily     No current facility-administered medications for this visit.     Medications Discontinued During This Encounter   Medication Reason     atorvastatin (LIPITOR) 10 MG tablet Discontinued by another Health Care Provider     clopidogrel (PLAVIX) 75 MG tablet Discontinued by another Health Care Provider     tadalafil (CIALIS) 5 MG tablet Discontinued by another Health Care Provider     hydrochlorothiazide (HYDRODIURIL) 12.5 MG tablet        Physical Exam   Vital Signs: /68 (BP Location: Left arm, Patient Position: Left side, Cuff Size: Adult Regular)   Pulse 80   Ht 1.778 m (5' 10\")   Wt 76.6 kg (168 lb 14.4 oz)   BMI 24.23 kg/m      GENERAL APPEARANCE: alert and no distress  HENT: mouth without ulcers or " lesions  LYMPHATICS: no cervical or supraclavicular nodes  RESP: lungs clear to auscultation - no rales, rhonchi or wheezes  CV: regular rhythm, normal rate, no rub, no murmur  EDEMA: no LE edema bilaterally  ABDOMEN: soft, nondistended, nontender, bowel sounds normal  MS: extremities normal - no gross deformities noted, no evidence of inflammation in joints, no muscle tenderness  SKIN: no rash  TX KIDNEY: normal  DIALYSIS ACCESS: none    Data     Renal Latest Ref Rng & Units 3/8/2019 11/21/2016 8/3/2015   Na (external) 136 - 145 mmol/L 139 140 141   K (external) 3.5 - 5.1 mmol/L 3.3(L) 3.7 3.9   Cl 101 - 109 mmol/L 104 99(L) 102   CO2 (external) 25 - 33 mmol/L 28 30 29   BUN (external) 8 - 26 mg/dl 34(H) 19 24   Cr (external) 0.7 - 1.3 mg/dl 1.4(H) 1.3 1.3   Glucose (external) 70 - 110 mg/dl 91 90 86   Ca (external) 8.4 - 10.2 mg/dl 8.6 9.4 9.1        Heme Latest Ref Rng & Units 3/8/2019 11/21/2016   WBC (external) 4.0 - 11.0 10:3 7.5 7.9   Hgb (external) 13.5 - 17.5 g/dl 15.8 16.7   Plt (external) 150 - 450 10:3 327 343   ABSOLUTE NEUTROPHILS (EXTERNAL) 1.6 - 8.3 10:3 4.2 -   ABSOLUTE LYMPHOCYTES (EXTERNAL) 0.6 - 5.0 10:3 2.1 -   ABSOLUTE MONOCYTES (EXTERNAL) 0.0 - 1.3 10:3 0.9 -   ABSOLUTE EOSINOPHILS (EXTERNAL) 0.0 - 0.8 10:3 0.2 -   ABSOLUTE BASOPHILS (EXTERNAL) 0.0 - 0.2 10:3 0.0 -     Liver Latest Ref Rng & Units 3/8/2019 11/21/2016 8/3/2015   AP (external) 40 - 150 u/l 91 85 89   TBili (external) 0.2 - 1.2 mg/dl 1.0 1.3(H) 1.0   ALT (external) 0 - 55 u/l 27 38 25   AST (external) 5 - 34 u/l 31 27 30   Tot Protein (external) 6.4 - 8.3 g/dl 7.0 6.7 6.3(L)   Albumin (external) 3.5 - 5.0 g/dl 4.0 4.1 3.4(L)

## 2022-06-17 NOTE — LETTER
6/17/2022      RE: Didier Brooks  [address] [address]       CHRONIC TRANSPLANT NEPHROLOGY VISIT    Assessment & Plan   # LDKT: Increased creatinine slightly to ~ 1.6 with last couple of checks.  This could be partly due to taking a higher dose of diuretic, as well as sinus infection with last check.  Doubt acute rejection this far out, although patient is on very low immunosuppression.  Will follow.   - Baseline Creatinine:  ~ 1.3-1.4   - Proteinuria: Minimal (0.2-0.5 grams)   - Date DSA Last Checked: Not Known      Latest DSA: Not checked recently due to time from transplant   - BK Viremia: Not checked recently due to time from transplant   - Kidney Tx Biopsy: No    # Immunosuppression: Azathioprine (dose 12.5 mg daily) and Prednisone (dose 2.5 mg daily)    - Patient reports self tapering down to this level of immunosuppression and has been stable on it for over 15+ years.   - Continue with intensive monitoring of immunosuppression for efficacy and toxicity.   - Changes: No    # Infection Prophylaxis:   Last CD4 Level: Not checked  - PJP: None    # Hypertension: Controlled;  Goal BP: < 130/80   - Changes: No    # Mineral Bone Disorder:   - Vitamin D; level: Not checked recently        On supplement: No  - Calcium; level: Normal        On supplement: No    # CAD, s/p PCI: Asymptomatic.    # BPH: Slow urinary stream, but reports no obvious issues emptying his bladder on tamsulosin.  Cannot completely rule out UTI with urinary urgency.   - Would recommend follow up with Urology.   - Will check urinalysis to rule out UTI.    # TANIKA on CPAP: Patient is compliant.    # Leukocytosis: Slightly elevated WBC at 10K with last check, but this was during sinusitis episode.   - Recommend rechecking sooner than routine.    # Skin Cancer: New lesions: none   - Discussed sun protection and recommend regular follow up with Dermatology.    # Medical Compliance: No.  Evidence of labs less than recommended.  -  Discussed importance of checking labs regularly as recommended, taking medications as prescribed and attending scheduled medical appointments.    # COVID-19 Virus Review: Discussed COVID-19 virus and the potential medical risks.  Reviewed preventative health recommendations, including wearing a mask where appropriate.  Recommended COVID vaccination should be up to date with either an initial vaccination or booster shot when appropriate.  Asked the patient to inform the transplant center if they are exposed or diagnosed with this virus.    # COVID Vaccination Up To Date: Yes    # Transplant History:  Etiology of Kidney Failure: Unknown etiology  Tx: LDKT  Transplant: 9/16/1981 (Kidney)  Significant changes in immunosuppression: Self tapered immunosuppression by patient over many years.  Significant transplant-related complications: None    Transplant Office Phone Number: [phone]    Assessment and plan was discussed with the patient and he voiced his understanding and agreement.    Return visit: Return in about 1 year (around 6/17/2023).    Bart Capellan MD    Chief Complaint   Mr. Brooks is a 77 year old here for kidney transplant and immunosuppression management.    History of Present Illness    Mr. Brooks reports feeling good overall with some medical complaints.  Since last clinic visit, patient reports no hospitalizations or new medical complaints and has been doing well overall.  Patient was having sinus issues lately, but saw his PCP and started on antibiotics for sinusitis.  Symptoms have since resolved.    His energy is good and has remained normal.  He is active and does get some exercise.  Denies any chest pain or shortness of breath with exertion.  No leg swelling.    Appetite is good, but he is trying to eat less and healthier.  He has lost about 20 lbs.  No nausea, vomiting or diarrhea.  No fever, sweats or chills.  No night sweats.    Patient does report some ongoing issues with emptying his  "bladder.  His stream is slower and occasional urinary urgency.  No pain or burning with urination.  No blood in his urine.    Home BP: 120/70s with no lightheadedness    Problem List   Patient Active Problem List   Diagnosis     Kidney replaced by transplant     Immunosuppression (H)     HTN, kidney transplant related     Dyslipidemia     BPH (benign prostatic hyperplasia)     Arthritis     Skin cancer     Aftercare following organ transplant     Chronic kidney disease, stage 3 (H)     Vitamin D deficiency     CAD (coronary artery disease)       Allergies   No Known Allergies    Medications   Current Outpatient Medications   Medication Sig     amLODIPine (NORVASC) 10 MG tablet Take 1 tablet (10 mg) by mouth At Bedtime     azaTHIOprine (IMURAN) 50 MG tablet Take 0.5 tablets (25 mg) by mouth daily Take 1/2 tablet (25 MG) by mouth daily (Patient taking differently: Take 12.5 mg by mouth daily Take 1/2 tablet (25 MG) by mouth daily)     hydrochlorothiazide (HYDRODIURIL) 25 MG tablet Take 0.5 tablets (12.5 mg) by mouth daily     metoprolol tartrate (LOPRESSOR) 25 MG tablet Take 1 tablet (25 mg) by mouth 2 times daily     predniSONE (DELTASONE) 5 MG tablet Take 0.5 tablets (2.5 mg) by mouth daily     tamsulosin (FLOMAX) 0.4 MG capsule Take 2 capsules (0.8 mg) by mouth daily     No current facility-administered medications for this visit.     Medications Discontinued During This Encounter   Medication Reason     atorvastatin (LIPITOR) 10 MG tablet Discontinued by another Health Care Provider     clopidogrel (PLAVIX) 75 MG tablet Discontinued by another Health Care Provider     tadalafil (CIALIS) 5 MG tablet Discontinued by another Health Care Provider     hydrochlorothiazide (HYDRODIURIL) 12.5 MG tablet        Physical Exam   Vital Signs: /68 (BP Location: Left arm, Patient Position: Left side, Cuff Size: Adult Regular)   Pulse 80   Ht 1.778 m (5' 10\")   Wt 76.6 kg (168 lb 14.4 oz)   BMI 24.23 kg/m      GENERAL " APPEARANCE: alert and no distress  HENT: mouth without ulcers or lesions  LYMPHATICS: no cervical or supraclavicular nodes  RESP: lungs clear to auscultation - no rales, rhonchi or wheezes  CV: regular rhythm, normal rate, no rub, no murmur  EDEMA: no LE edema bilaterally  ABDOMEN: soft, nondistended, nontender, bowel sounds normal  MS: extremities normal - no gross deformities noted, no evidence of inflammation in joints, no muscle tenderness  SKIN: no rash  TX KIDNEY: normal  DIALYSIS ACCESS: none    Data     Renal Latest Ref Rng & Units 3/8/2019 11/21/2016 8/3/2015   Na (external) 136 - 145 mmol/L 139 140 141   K (external) 3.5 - 5.1 mmol/L 3.3(L) 3.7 3.9   Cl 101 - 109 mmol/L 104 99(L) 102   CO2 (external) 25 - 33 mmol/L 28 30 29   BUN (external) 8 - 26 mg/dl 34(H) 19 24   Cr (external) 0.7 - 1.3 mg/dl 1.4(H) 1.3 1.3   Glucose (external) 70 - 110 mg/dl 91 90 86   Ca (external) 8.4 - 10.2 mg/dl 8.6 9.4 9.1        Heme Latest Ref Rng & Units 3/8/2019 11/21/2016   WBC (external) 4.0 - 11.0 10:3 7.5 7.9   Hgb (external) 13.5 - 17.5 g/dl 15.8 16.7   Plt (external) 150 - 450 10:3 327 343   ABSOLUTE NEUTROPHILS (EXTERNAL) 1.6 - 8.3 10:3 4.2 -   ABSOLUTE LYMPHOCYTES (EXTERNAL) 0.6 - 5.0 10:3 2.1 -   ABSOLUTE MONOCYTES (EXTERNAL) 0.0 - 1.3 10:3 0.9 -   ABSOLUTE EOSINOPHILS (EXTERNAL) 0.0 - 0.8 10:3 0.2 -   ABSOLUTE BASOPHILS (EXTERNAL) 0.0 - 0.2 10:3 0.0 -     Liver Latest Ref Rng & Units 3/8/2019 11/21/2016 8/3/2015   AP (external) 40 - 150 u/l 91 85 89   TBili (external) 0.2 - 1.2 mg/dl 1.0 1.3(H) 1.0   ALT (external) 0 - 55 u/l 27 38 25   AST (external) 5 - 34 u/l 31 27 30   Tot Protein (external) 6.4 - 8.3 g/dl 7.0 6.7 6.3(L)   Albumin (external) 3.5 - 5.0 g/dl 4.0 4.1 3.4(L)                             Bart Capellan MD

## 2022-06-17 NOTE — LETTER
6/17/2022       RE: Didier Brooks  [address] [facility] [address]     Dear Colleague,    Thank you for referring your patient, Didier Brooks, to the [facility] KIDNEY SERVICES at [facility]. Please see a copy of my visit note below.    CHRONIC TRANSPLANT NEPHROLOGY VISIT    Assessment & Plan   # LDKT: Increased creatinine slightly to ~ 1.6 with last couple of checks.  This could be partly due to taking a higher dose of diuretic, as well as sinus infection with last check.  Doubt acute rejection this far out, although patient is on very low immunosuppression.  Will follow.   - Baseline Creatinine:  ~ 1.3-1.4   - Proteinuria: Minimal (0.2-0.5 grams)   - Date DSA Last Checked: Not Known      Latest DSA: Not checked recently due to time from transplant   - BK Viremia: Not checked recently due to time from transplant   - Kidney Tx Biopsy: No    # Immunosuppression: Azathioprine (dose 12.5 mg daily) and Prednisone (dose 2.5 mg daily)    - Patient reports self tapering down to this level of immunosuppression and has been stable on it for over 15+ years.   - Continue with intensive monitoring of immunosuppression for efficacy and toxicity.   - Changes: No    # Infection Prophylaxis:   Last CD4 Level: Not checked  - PJP: None    # Hypertension: Controlled;  Goal BP: < 130/80   - Changes: No    # Mineral Bone Disorder:   - Vitamin D; level: Not checked recently        On supplement: No  - Calcium; level: Normal        On supplement: No    # CAD, s/p PCI: Asymptomatic.    # BPH: Slow urinary stream, but reports no obvious issues emptying his bladder on tamsulosin.  Cannot completely rule out UTI with urinary urgency.   - Would recommend follow up with Urology.   - Will check urinalysis to rule out UTI.    # TANIKA on CPAP: Patient is compliant.    # Leukocytosis: Slightly elevated WBC at 10K with last check, but this was during sinusitis episode.   -  Recommend rechecking sooner than routine.    # Skin Cancer: New lesions: none   - Discussed sun protection and recommend regular follow up with Dermatology.    # Medical Compliance: No.  Evidence of labs less than recommended.  - Discussed importance of checking labs regularly as recommended, taking medications as prescribed and attending scheduled medical appointments.    # COVID-19 Virus Review: Discussed COVID-19 virus and the potential medical risks.  Reviewed preventative health recommendations, including wearing a mask where appropriate.  Recommended COVID vaccination should be up to date with either an initial vaccination or booster shot when appropriate.  Asked the patient to inform the transplant center if they are exposed or diagnosed with this virus.    # COVID Vaccination Up To Date: Yes    # Transplant History:  Etiology of Kidney Failure: Unknown etiology  Tx: LDKT  Transplant: 9/16/1981 (Kidney)  Significant changes in immunosuppression: Self tapered immunosuppression by patient over many years.  Significant transplant-related complications: None    Transplant Office Phone Number: [phone]    Assessment and plan was discussed with the patient and he voiced his understanding and agreement.    Return visit: Return in about 1 year (around 6/17/2023).    Bart Capellan MD    Chief Complaint   Mr. Brooks is a 77 year old here for kidney transplant and immunosuppression management.    History of Present Illness    Mr. Brooks reports feeling good overall with some medical complaints.  Since last clinic visit, patient reports no hospitalizations or new medical complaints and has been doing well overall.  Patient was having sinus issues lately, but saw his PCP and started on antibiotics for sinusitis.  Symptoms have since resolved.    His energy is good and has remained normal.  He is active and does get some exercise.  Denies any chest pain or shortness of breath with exertion.  No leg  swelling.    Appetite is good, but he is trying to eat less and healthier.  He has lost about 20 lbs.  No nausea, vomiting or diarrhea.  No fever, sweats or chills.  No night sweats.    Patient does report some ongoing issues with emptying his bladder.  His stream is slower and occasional urinary urgency.  No pain or burning with urination.  No blood in his urine.    Home BP: 120/70s with no lightheadedness    Problem List   Patient Active Problem List   Diagnosis     Kidney replaced by transplant     Immunosuppression (H)     HTN, kidney transplant related     Dyslipidemia     BPH (benign prostatic hyperplasia)     Arthritis     Skin cancer     Aftercare following organ transplant     Chronic kidney disease, stage 3 (H)     Vitamin D deficiency     CAD (coronary artery disease)       Allergies   No Known Allergies    Medications   Current Outpatient Medications   Medication Sig     amLODIPine (NORVASC) 10 MG tablet Take 1 tablet (10 mg) by mouth At Bedtime     azaTHIOprine (IMURAN) 50 MG tablet Take 0.5 tablets (25 mg) by mouth daily Take 1/2 tablet (25 MG) by mouth daily (Patient taking differently: Take 12.5 mg by mouth daily Take 1/2 tablet (25 MG) by mouth daily)     hydrochlorothiazide (HYDRODIURIL) 25 MG tablet Take 0.5 tablets (12.5 mg) by mouth daily     metoprolol tartrate (LOPRESSOR) 25 MG tablet Take 1 tablet (25 mg) by mouth 2 times daily     predniSONE (DELTASONE) 5 MG tablet Take 0.5 tablets (2.5 mg) by mouth daily     tamsulosin (FLOMAX) 0.4 MG capsule Take 2 capsules (0.8 mg) by mouth daily     No current facility-administered medications for this visit.     Medications Discontinued During This Encounter   Medication Reason     atorvastatin (LIPITOR) 10 MG tablet Discontinued by another Health Care Provider     clopidogrel (PLAVIX) 75 MG tablet Discontinued by another Health Care Provider     tadalafil (CIALIS) 5 MG tablet Discontinued by another Health Care Provider     hydrochlorothiazide  "(HYDRODIURIL) 12.5 MG tablet        Physical Exam   Vital Signs: /68 (BP Location: Left arm, Patient Position: Left side, Cuff Size: Adult Regular)   Pulse 80   Ht 1.778 m (5' 10\")   Wt 76.6 kg (168 lb 14.4 oz)   BMI 24.23 kg/m      GENERAL APPEARANCE: alert and no distress  HENT: mouth without ulcers or lesions  LYMPHATICS: no cervical or supraclavicular nodes  RESP: lungs clear to auscultation - no rales, rhonchi or wheezes  CV: regular rhythm, normal rate, no rub, no murmur  EDEMA: no LE edema bilaterally  ABDOMEN: soft, nondistended, nontender, bowel sounds normal  MS: extremities normal - no gross deformities noted, no evidence of inflammation in joints, no muscle tenderness  SKIN: no rash  TX KIDNEY: normal  DIALYSIS ACCESS: none    Data     Renal Latest Ref Rng & Units 3/8/2019 11/21/2016 8/3/2015   Na (external) 136 - 145 mmol/L 139 140 141   K (external) 3.5 - 5.1 mmol/L 3.3(L) 3.7 3.9   Cl 101 - 109 mmol/L 104 99(L) 102   CO2 (external) 25 - 33 mmol/L 28 30 29   BUN (external) 8 - 26 mg/dl 34(H) 19 24   Cr (external) 0.7 - 1.3 mg/dl 1.4(H) 1.3 1.3   Glucose (external) 70 - 110 mg/dl 91 90 86   Ca (external) 8.4 - 10.2 mg/dl 8.6 9.4 9.1        Heme Latest Ref Rng & Units 3/8/2019 11/21/2016   WBC (external) 4.0 - 11.0 10:3 7.5 7.9   Hgb (external) 13.5 - 17.5 g/dl 15.8 16.7   Plt (external) 150 - 450 10:3 327 343   ABSOLUTE NEUTROPHILS (EXTERNAL) 1.6 - 8.3 10:3 4.2 -   ABSOLUTE LYMPHOCYTES (EXTERNAL) 0.6 - 5.0 10:3 2.1 -   ABSOLUTE MONOCYTES (EXTERNAL) 0.0 - 1.3 10:3 0.9 -   ABSOLUTE EOSINOPHILS (EXTERNAL) 0.0 - 0.8 10:3 0.2 -   ABSOLUTE BASOPHILS (EXTERNAL) 0.0 - 0.2 10:3 0.0 -     Liver Latest Ref Rng & Units 3/8/2019 11/21/2016 8/3/2015   AP (external) 40 - 150 u/l 91 85 89   TBili (external) 0.2 - 1.2 mg/dl 1.0 1.3(H) 1.0   ALT (external) 0 - 55 u/l 27 38 25   AST (external) 5 - 34 u/l 31 27 30   Tot Protein (external) 6.4 - 8.3 g/dl 7.0 6.7 6.3(L)   Albumin (external) 3.5 - 5.0 g/dl 4.0 4.1 " 3.4(L)         Again, thank you for allowing me to participate in the care of your patient.      Sincerely,    Bart Capellan MD

## 2022-06-21 DIAGNOSIS — R39.15 URINARY URGENCY: ICD-10-CM

## 2022-06-21 DIAGNOSIS — R39.89 SUSPECTED UTI: ICD-10-CM

## 2022-06-21 DIAGNOSIS — R39.15 BENIGN PROSTATIC HYPERPLASIA (BPH) WITH URINARY URGENCY: Primary | ICD-10-CM

## 2022-06-21 DIAGNOSIS — N40.1 BENIGN PROSTATIC HYPERPLASIA (BPH) WITH URINARY URGENCY: Primary | ICD-10-CM

## 2022-06-21 NOTE — LETTER
PHYSICIAN ORDERS      DATE & TIME ISSUED: 2022 8:19 AM  PATIENT NAME: Didier Brooks   : 1945     [facility] MR# [if applicable]: [id number]     DIAGNOSIS/ICD-10 CODE: Kidney Transplanted (Z94.0);   Vitamin D Deficiency (E55.9); Suspected UTI (R39.89);   Elevated Serum Creatinine (R79.89)     Please collect the following labs due now in addition to standing labs:  - Urinalysis  - Vitamin D deficiency level    Standing Labs:    Every 3 months:  - BMP  - CBC with platelets    Every 6 months:  - Urine Protein Creatinine Ratio    Any questions please call: [phone]  Fax results to:  [phone].    .

## 2022-06-21 NOTE — PROGRESS NOTES
Clinic visit  Received: 4 days ago  Bart Capellan MD Blaisdell, Maria D Little, RN    Please check the following labs: Vitamin D and Urinalysis.     Patient should get labs q3 months.     Recommended he see Urology.     Delvis     OUTCOME: Dr. Capellan's note reviewed from 6/17/22.  - Lab requisition letter sent.  - Standing lab orders were sent 6/1/22  - Urology referral placed for BPH with urinary urgency    Maria D Sam, RN, BSN  Solid Organ Transplant, Post Kidney and Pancreas  Transplant Care Coordinator  [phone]

## 2022-08-16 ENCOUNTER — TELEPHONE (OUTPATIENT)
Dept: TRANSPLANT | Facility: CLINIC | Age: 77
End: 2022-08-16

## 2022-08-17 NOTE — TELEPHONE ENCOUNTER
On call coordinator paged 8/16/22 at 9:04 pm:    Katy calls to relay that Andrea is Covid positive with fever of 101.6, chills, fatigue. No respiratory symptoms. Encouraged fluids and tylenol for comfort. Will forward to coordinator to arrange appt with nephrology to discuss treatment. Asked Katy to monitor for SOB and if this develops to either bring to ER or call 911.

## 2022-08-17 NOTE — TELEPHONE ENCOUNTER
RNCC spoke with MELISSA Culver. Given Dr. Capellan's recommendations for Paxlovid and Monoclonal Antibody infusion. States Didier will need to be renally doses due to his last Cr of 1.6. They will have ID COVID coordinator reach out to Didier to coordinate care.     Appreciate the local collaboration,    Maria D Sam RN, BSN  Solid Organ Transplant, Post Kidney and Pancreas  Transplant Care Coordinator  [phone]

## 2022-08-17 NOTE — TELEPHONE ENCOUNTER
COVID-19 + Diagnosis evening of 8/16/22 with home antigen test. Wife also positive.  Tired all day yesterday (fatigue), symptoms started at 6 chills, worsen at 7 with fever as high as 103 at 2 AM; taking Tylenol. , Fever broke. O2 saturation above 92%. Denies shortness of breath, chest pain/pressure. No sore throat, runny nose or congestion. Mild rare occurring cough. No N/V/D. Hydrating well.     Transplanted Kidney 9/16/1981  Last transplant nephrology visit with Dr. Capellan 6/17/22  Immunosuppression Regimen: Azathioprine 12.5 mg (0.5 tab) PO daily  Prednisone 2.5 mg (0.5 mg) PO daily    Resides in [place], [place]; generally uses [email or web address] pharmacy and lab facility.  Preferred lab on file:  [facility] INTER[facility]   Phone:  [phone]   Fax:  [phone]          COVID-19 Vaccination history:      Will reach out to Dr. Capellan regarding recommendations for monoclonal antibody infusion therapy referral and if any immunosuppression changes or antiviral therapies are recommended.    Addendum:  Message  Received: Today  Bart Caepllan MD Blaisdell, Christin Rebecca, RN  Caller: Unspecified (Yesterday,  9:04 PM)  Yes, on monoclonal antibody.  Paxlovid is fine, but not sure if our BALDO or better his PCP should order.     Delvis             Previous Messages       ----- Message -----   From: Maria D Sam, RN   Sent: 8/17/2022  11:26 AM CDT   To: Bart Capellan MD     ----- Message from Maria D Sam RN sent at 8/17/2022 11:26 AM CDT -----   Please see note regarding COVID-19 positive patient. On very little immunosuppression. Refer for monoclonal antibody therapy? Also in his case, he is not on tacrolimus so would Paxlovid be indicated & does he need to do video visit with BALDO? Please advise.   Thank you,   Maria D   ----- Message from Johnny Medrano sent at 8/17/2022  8:27 AM CDT -----     ----- Message from Christine Ledesma RN sent at 8/17/2022   7:16 AM CDT -----   Alan- they prefer a virtual visit if possible. Let me know if you want me to schedule.   Christine CARTERCC placed call to Physician Assistant, Joshua Kelly's nurse, Joann at [facility] to ask for assistance in prescribing Paxlovid for COVID-19 and referral for monoclonal antibody infusion. Updated patient on progress with plan of treatment. Awaiting call from PAC.     Maria D Sam RN, BSN  Solid Organ Transplant, Post Kidney and Pancreas  Transplant Care Coordinator  [phone]

## 2022-10-16 ENCOUNTER — HEALTH MAINTENANCE LETTER (OUTPATIENT)
Age: 77
End: 2022-10-16

## 2023-04-01 ENCOUNTER — HEALTH MAINTENANCE LETTER (OUTPATIENT)
Age: 78
End: 2023-04-01

## 2023-06-16 ENCOUNTER — OFFICE VISIT (OUTPATIENT)
Dept: NEPHROLOGY | Facility: CLINIC | Age: 78
End: 2023-06-16
Payer: MEDICARE

## 2023-06-16 VITALS
BODY MASS INDEX: 24.64 KG/M2 | DIASTOLIC BLOOD PRESSURE: 62 MMHG | SYSTOLIC BLOOD PRESSURE: 138 MMHG | WEIGHT: 171.7 LBS | HEART RATE: 72 BPM

## 2023-06-16 DIAGNOSIS — Z94.0 HTN, KIDNEY TRANSPLANT RELATED: ICD-10-CM

## 2023-06-16 DIAGNOSIS — N18.31 STAGE 3A CHRONIC KIDNEY DISEASE (H): ICD-10-CM

## 2023-06-16 DIAGNOSIS — I15.1 HTN, KIDNEY TRANSPLANT RELATED: ICD-10-CM

## 2023-06-16 DIAGNOSIS — D84.9 IMMUNOSUPPRESSED STATUS (H): ICD-10-CM

## 2023-06-16 DIAGNOSIS — Z48.298 AFTERCARE FOLLOWING ORGAN TRANSPLANT: ICD-10-CM

## 2023-06-16 DIAGNOSIS — Z94.0 KIDNEY REPLACED BY TRANSPLANT: Primary | ICD-10-CM

## 2023-06-16 PROCEDURE — 99214 OFFICE O/P EST MOD 30 MIN: CPT | Performed by: INTERNAL MEDICINE

## 2023-06-16 RX ORDER — PREDNISONE 2.5 MG/1
2.5 TABLET ORAL EVERY MORNING
COMMUNITY
Start: 2023-05-18

## 2023-06-16 NOTE — LETTER
6/16/2023       RE: Didier Brooks  [phone]  [place] [address]     Dear Colleague,    Thank you for referring your patient, Didier Brooks, to the [facility] KID[facility] at [facility]. Please see a copy of my visit note below.    CHRONIC TRANSPLANT NEPHROLOGY VISIT    Assessment & Plan   # LDKT: Stable creatinine more lately after having some slightly higher levels over the last year or so.  Doubt acute rejection, but likely has some chronic changes.   - Baseline Creatinine:  ~ 1.3-1.4   - Proteinuria: Not checked recently   - Date DSA Last Checked: Not Known      Latest DSA: Not checked recently due to time from transplant   - BK Viremia: Not checked recently due to time from transplant   - Kidney Tx Biopsy: No    # Immunosuppression: Azathioprine (dose 12.5 mg daily) and Prednisone (dose 2.5 mg daily)    - Patient self tapering down to this level of immunosuppression and has been stable on it for over 15+ years.   - Continue with intensive monitoring of immunosuppression for efficacy and toxicity.   - Changes: No    # Infection Prophylaxis:   Last CD4 Level: Not checked  - PJP: None    # Hypertension: Controlled;  Goal BP: < 130/80   - Changes: No    # Mineral Bone Disorder:   - Vitamin D; level: Not checked recently        On supplement: No  - Calcium; level: Normal        On supplement: No    # CAD, s/p PCI: Asymptomatic.    # BPH: Stable, but elevated PSA.  Slow urinary stream, but reports no obvious issues emptying his bladder on tamsulosin.  He has not seen Urology lately.   - Would recommend follow up with Urology.    # TANIKA on CPAP: Patient is compliant.    # Skin Cancer: New lesions: a couple   - Discussed sun protection and recommend regular follow up with Dermatology.    # Medical Compliance: No.  Evidence of labs less than recommended.  - Discussed importance of checking labs regularly as recommended, taking medications as  prescribed and attending scheduled medical appointments.    # Health Maintenance and Vaccination Review: Not Reviewed    # Transplant History:  Etiology of Kidney Failure: Unknown etiology  Tx: LDKT  Transplant: 9/16/1981 (Kidney)  Significant changes in immunosuppression: Self tapered immunosuppression by patient over many years.  Significant transplant-related complications: None    Transplant Office Phone Number: [phone]    Assessment and plan was discussed with the patient and he voiced his understanding and agreement.    Return visit: Return in about 1 year (around 6/16/2024).    Bart Capellan MD    Chief Complaint   Mr. Brooks is a 78 year old here for kidney transplant and immunosuppression management.    History of Present Illness    Mr. Brooks reports feeling good overall with some medical complaints.  Since last clinic visit, patient reports no hospitalizations or new medical complaints and has been doing well overall.  His energy level is good and remains normal.  He is active and does get some exercise.  He is bothered by arthritis, however.  Denies any chest pain or shortness of breath with exertion.  No leg swelling.    Appetite is good and weight is stable.  No nausea, vomiting or diarrhea.  No fever, sweats or chills.  No night sweats.    Home BP: 120/70s with no lightheadedness    Problem List   Patient Active Problem List   Diagnosis     Kidney replaced by transplant     Immunosuppressed status (H)     HTN, kidney transplant related     Dyslipidemia     BPH (benign prostatic hyperplasia)     Arthritis     Skin cancer     Aftercare following organ transplant     Stage 3a chronic kidney disease (H)     Vitamin D deficiency     CAD (coronary artery disease)       Allergies   No Known Allergies    Medications   Current Outpatient Medications   Medication Sig     amLODIPine (NORVASC) 10 MG tablet Take 1 tablet (10 mg) by mouth At Bedtime     azaTHIOprine (IMURAN) 50 MG tablet Take 0.5 tablets  (25 mg) by mouth daily Take 1/2 tablet (25 MG) by mouth daily (Patient taking differently: Take 12.5 mg by mouth daily Take 1/2 tablet (25 MG) by mouth daily)     hydrochlorothiazide (HYDRODIURIL) 25 MG tablet Take 25 mg by mouth daily     metoprolol tartrate (LOPRESSOR) 25 MG tablet Take 1 tablet (25 mg) by mouth 2 times daily     predniSONE (DELTASONE) 2.5 MG tablet Take 2.5 mg by mouth every morning     tamsulosin (FLOMAX) 0.4 MG capsule Take 2 capsules (0.8 mg) by mouth daily     No current facility-administered medications for this visit.     Medications Discontinued During This Encounter   Medication Reason     predniSONE (DELTASONE) 5 MG tablet        Physical Exam   Vital Signs: /62 (BP Location: Left arm, Patient Position: Sitting, Cuff Size: Adult Regular)   Pulse 72   Wt 77.9 kg (171 lb 11.2 oz)   BMI 24.64 kg/m      GENERAL APPEARANCE: alert and no distress  HENT: mouth without ulcers or lesions  LYMPHATICS: no cervical or supraclavicular nodes  RESP: lungs clear to auscultation - no rales, rhonchi or wheezes  CV: regular rhythm, normal rate, no rub, no murmur  EDEMA: no LE edema bilaterally  ABDOMEN: soft, nondistended, nontender, bowel sounds normal  MS: extremities normal - no gross deformities noted, no evidence of inflammation in joints, no muscle tenderness  SKIN: no rash  TX KIDNEY: normal  DIALYSIS ACCESS: none    Data         Latest Ref Rng & Units 3/8/2019     9:25 AM 11/21/2016    10:05 AM 8/3/2015     8:55 AM   Renal   Na (external) 136 - 145 mmol/L 139     140     141       K (external) 3.5 - 5.1 mmol/L 3.3     3.7     3.9       Cl 101 - 109 mmol/L 104     99     102       Cl (external) 101 - 109 mmol/L 104     99     102       CO2 (external) 25 - 33 mmol/L 28     30     29       BUN (external) 8 - 26 mg/dl 34     19     24       Cr (external) 0.7 - 1.3 mg/dl 1.4     1.3     1.3       Glucose (external) 70 - 110 mg/dl 91     90     86       Ca (external) 8.4 - 10.2 mg/dl 8.6     9.4      9.1           This result is from an external source.            Latest Ref Rng & Units 3/8/2019     9:25 AM 11/21/2016    10:05 AM   Heme   WBC (external) 4.0 - 11.0 10^3 7.5     7.9       Hgb (external) 13.5 - 17.5 g/dl 15.8     16.7       Plt (external) 150 - 450 10^3 327     343       ABSOLUTE NEUTROPHILS (EXTERNAL) 1.6 - 8.3 10^3 4.2        ABSOLUTE LYMPHOCYTES (EXTERNAL) 0.6 - 5.0 10^3 2.1        ABSOLUTE MONOCYTES (EXTERNAL) 0.0 - 1.3 10^3 0.9        ABSOLUTE EOSINOPHILS (EXTERNAL) 0.0 - 0.8 10^3 0.2        ABSOLUTE BASOPHILS (EXTERNAL) 0.0 - 0.2 10^3 0.0            This result is from an external source.         Latest Ref Rng & Units 3/8/2019     9:25 AM 11/21/2016    10:05 AM 8/3/2015     8:55 AM   Liver   AP (external) 40 - 150 u/l 91     85     89       TBili (external) 0.2 - 1.2 mg/dl 1.0     1.3     1.0       ALT (external) 0 - 55 u/l 27     38     25       AST (external) 5 - 34 u/l 31     27     30       Tot Protein (external) 6.4 - 8.3 g/dl 7.0     6.7     6.3       Albumin (external) 3.5 - 5.0 g/dl 4.0     4.1     3.4           This result is from an external source.                             Again, thank you for allowing me to participate in the care of your patient.      Sincerely,    Bart Capellan MD

## 2023-06-16 NOTE — LETTER
6/16/2023      RE: Didier Brooks  [address]  [place] [address]       CHRONIC TRANSPLANT NEPHROLOGY VISIT    Assessment & Plan   # LDKT: Stable creatinine more lately after having some slightly higher levels over the last year or so.  Doubt acute rejection, but likely has some chronic changes.   - Baseline Creatinine:  ~ 1.3-1.4   - Proteinuria: Not checked recently   - Date DSA Last Checked: Not Known      Latest DSA: Not checked recently due to time from transplant   - BK Viremia: Not checked recently due to time from transplant   - Kidney Tx Biopsy: No    # Immunosuppression: Azathioprine (dose 12.5 mg daily) and Prednisone (dose 2.5 mg daily)    - Patient self tapering down to this level of immunosuppression and has been stable on it for over 15+ years.   - Continue with intensive monitoring of immunosuppression for efficacy and toxicity.   - Changes: No    # Infection Prophylaxis:   Last CD4 Level: Not checked  - PJP: None    # Hypertension: Controlled;  Goal BP: < 130/80   - Changes: No    # Mineral Bone Disorder:   - Vitamin D; level: Not checked recently        On supplement: No  - Calcium; level: Normal        On supplement: No    # CAD, s/p PCI: Asymptomatic.    # BPH: Stable, but elevated PSA.  Slow urinary stream, but reports no obvious issues emptying his bladder on tamsulosin.  He has not seen Urology lately.   - Would recommend follow up with Urology.    # TANIKA on CPAP: Patient is compliant.    # Skin Cancer: New lesions: a couple   - Discussed sun protection and recommend regular follow up with Dermatology.    # Medical Compliance: No.  Evidence of labs less than recommended.  - Discussed importance of checking labs regularly as recommended, taking medications as prescribed and attending scheduled medical appointments.    # Health Maintenance and Vaccination Review: Not Reviewed    # Transplant History:  Etiology of Kidney Failure: Unknown etiology  Tx: LDKT  Transplant: 9/16/1981  (Kidney)  Significant changes in immunosuppression: Self tapered immunosuppression by patient over many years.  Significant transplant-related complications: None    Transplant Office Phone Number: [phone]    Assessment and plan was discussed with the patient and he voiced his understanding and agreement.    Return visit: Return in about 1 year (around 6/16/2024).    Bart Capellan MD    Chief Complaint   Mr. Brooks is a 78 year old here for kidney transplant and immunosuppression management.    History of Present Illness    Mr. Brooks reports feeling good overall with some medical complaints.  Since last clinic visit, patient reports no hospitalizations or new medical complaints and has been doing well overall.  His energy level is good and remains normal.  He is active and does get some exercise.  He is bothered by arthritis, however.  Denies any chest pain or shortness of breath with exertion.  No leg swelling.    Appetite is good and weight is stable.  No nausea, vomiting or diarrhea.  No fever, sweats or chills.  No night sweats.    Home BP: 120/70s with no lightheadedness    Problem List   Patient Active Problem List   Diagnosis     Kidney replaced by transplant     Immunosuppressed status (H)     HTN, kidney transplant related     Dyslipidemia     BPH (benign prostatic hyperplasia)     Arthritis     Skin cancer     Aftercare following organ transplant     Stage 3a chronic kidney disease (H)     Vitamin D deficiency     CAD (coronary artery disease)       Allergies   No Known Allergies    Medications   Current Outpatient Medications   Medication Sig     amLODIPine (NORVASC) 10 MG tablet Take 1 tablet (10 mg) by mouth At Bedtime     azaTHIOprine (IMURAN) 50 MG tablet Take 0.5 tablets (25 mg) by mouth daily Take 1/2 tablet (25 MG) by mouth daily (Patient taking differently: Take 12.5 mg by mouth daily Take 1/2 tablet (25 MG) by mouth daily)     hydrochlorothiazide (HYDRODIURIL) 25 MG tablet Take 25 mg  by mouth daily     metoprolol tartrate (LOPRESSOR) 25 MG tablet Take 1 tablet (25 mg) by mouth 2 times daily     predniSONE (DELTASONE) 2.5 MG tablet Take 2.5 mg by mouth every morning     tamsulosin (FLOMAX) 0.4 MG capsule Take 2 capsules (0.8 mg) by mouth daily     No current facility-administered medications for this visit.     Medications Discontinued During This Encounter   Medication Reason     predniSONE (DELTASONE) 5 MG tablet        Physical Exam   Vital Signs: /62 (BP Location: Left arm, Patient Position: Sitting, Cuff Size: Adult Regular)   Pulse 72   Wt 77.9 kg (171 lb 11.2 oz)   BMI 24.64 kg/m      GENERAL APPEARANCE: alert and no distress  HENT: mouth without ulcers or lesions  LYMPHATICS: no cervical or supraclavicular nodes  RESP: lungs clear to auscultation - no rales, rhonchi or wheezes  CV: regular rhythm, normal rate, no rub, no murmur  EDEMA: no LE edema bilaterally  ABDOMEN: soft, nondistended, nontender, bowel sounds normal  MS: extremities normal - no gross deformities noted, no evidence of inflammation in joints, no muscle tenderness  SKIN: no rash  TX KIDNEY: normal  DIALYSIS ACCESS: none    Data         Latest Ref Rng & Units 3/8/2019     9:25 AM 11/21/2016    10:05 AM 8/3/2015     8:55 AM   Renal   Na (external) 136 - 145 mmol/L 139     140     141       K (external) 3.5 - 5.1 mmol/L 3.3     3.7     3.9       Cl 101 - 109 mmol/L 104     99     102       Cl (external) 101 - 109 mmol/L 104     99     102       CO2 (external) 25 - 33 mmol/L 28     30     29       BUN (external) 8 - 26 mg/dl 34     19     24       Cr (external) 0.7 - 1.3 mg/dl 1.4     1.3     1.3       Glucose (external) 70 - 110 mg/dl 91     90     86       Ca (external) 8.4 - 10.2 mg/dl 8.6     9.4     9.1           This result is from an external source.            Latest Ref Rng & Units 3/8/2019     9:25 AM 11/21/2016    10:05 AM   Heme   WBC (external) 4.0 - 11.0 10^3 7.5     7.9       Hgb (external) 13.5 - 17.5  g/dl 15.8     16.7       Plt (external) 150 - 450 10^3 327     343       ABSOLUTE NEUTROPHILS (EXTERNAL) 1.6 - 8.3 10^3 4.2        ABSOLUTE LYMPHOCYTES (EXTERNAL) 0.6 - 5.0 10^3 2.1        ABSOLUTE MONOCYTES (EXTERNAL) 0.0 - 1.3 10^3 0.9        ABSOLUTE EOSINOPHILS (EXTERNAL) 0.0 - 0.8 10^3 0.2        ABSOLUTE BASOPHILS (EXTERNAL) 0.0 - 0.2 10^3 0.0            This result is from an external source.         Latest Ref Rng & Units 3/8/2019     9:25 AM 11/21/2016    10:05 AM 8/3/2015     8:55 AM   Liver   AP (external) 40 - 150 u/l 91     85     89       TBili (external) 0.2 - 1.2 mg/dl 1.0     1.3     1.0       ALT (external) 0 - 55 u/l 27     38     25       AST (external) 5 - 34 u/l 31     27     30       Tot Protein (external) 6.4 - 8.3 g/dl 7.0     6.7     6.3       Albumin (external) 3.5 - 5.0 g/dl 4.0     4.1     3.4           This result is from an external source.                           Bart Capelaln MD

## 2023-06-24 PROBLEM — N18.31 STAGE 3A CHRONIC KIDNEY DISEASE (H): Status: ACTIVE | Noted: 2021-06-03

## 2023-06-24 NOTE — PROGRESS NOTES
CHRONIC TRANSPLANT NEPHROLOGY VISIT    Assessment & Plan   # LDKT: Stable creatinine more lately after having some slightly higher levels over the last year or so.  Doubt acute rejection, but likely has some chronic changes.   - Baseline Creatinine:  ~ 1.3-1.4   - Proteinuria: Not checked recently   - Date DSA Last Checked: Not Known      Latest DSA: Not checked recently due to time from transplant   - BK Viremia: Not checked recently due to time from transplant   - Kidney Tx Biopsy: No    # Immunosuppression: Azathioprine (dose 12.5 mg daily) and Prednisone (dose 2.5 mg daily)    - Patient self tapering down to this level of immunosuppression and has been stable on it for over 15+ years.   - Continue with intensive monitoring of immunosuppression for efficacy and toxicity.   - Changes: No    # Infection Prophylaxis:   Last CD4 Level: Not checked  - PJP: None    # Hypertension: Controlled;  Goal BP: < 130/80   - Changes: No    # Mineral Bone Disorder:   - Vitamin D; level: Not checked recently        On supplement: No  - Calcium; level: Normal        On supplement: No    # CAD, s/p PCI: Asymptomatic.    # BPH: Stable, but elevated PSA.  Slow urinary stream, but reports no obvious issues emptying his bladder on tamsulosin.  He has not seen Urology lately.   - Would recommend follow up with Urology.    # TANIKA on CPAP: Patient is compliant.    # Skin Cancer: New lesions: a couple   - Discussed sun protection and recommend regular follow up with Dermatology.    # Medical Compliance: No.  Evidence of labs less than recommended.  - Discussed importance of checking labs regularly as recommended, taking medications as prescribed and attending scheduled medical appointments.    # Health Maintenance and Vaccination Review: Not Reviewed    # Transplant History:  Etiology of Kidney Failure: Unknown etiology  Tx: LDKT  Transplant: 9/16/1981 (Kidney)  Significant changes in immunosuppression: Self tapered immunosuppression by  patient over many years.  Significant transplant-related complications: None    Transplant Office Phone Number: [phone]    Assessment and plan was discussed with the patient and he voiced his understanding and agreement.    Return visit: Return in about 1 year (around 6/16/2024).    Bart Capellan MD    Chief Complaint   Mr. Brooks is a 78 year old here for kidney transplant and immunosuppression management.    History of Present Illness    Mr. Brooks reports feeling good overall with some medical complaints.  Since last clinic visit, patient reports no hospitalizations or new medical complaints and has been doing well overall.  His energy level is good and remains normal.  He is active and does get some exercise.  He is bothered by arthritis, however.  Denies any chest pain or shortness of breath with exertion.  No leg swelling.    Appetite is good and weight is stable.  No nausea, vomiting or diarrhea.  No fever, sweats or chills.  No night sweats.    Home BP: 120/70s with no lightheadedness    Problem List   Patient Active Problem List   Diagnosis     Kidney replaced by transplant     Immunosuppressed status (H)     HTN, kidney transplant related     Dyslipidemia     BPH (benign prostatic hyperplasia)     Arthritis     Skin cancer     Aftercare following organ transplant     Stage 3a chronic kidney disease (H)     Vitamin D deficiency     CAD (coronary artery disease)       Allergies   No Known Allergies    Medications   Current Outpatient Medications   Medication Sig     amLODIPine (NORVASC) 10 MG tablet Take 1 tablet (10 mg) by mouth At Bedtime     azaTHIOprine (IMURAN) 50 MG tablet Take 0.5 tablets (25 mg) by mouth daily Take 1/2 tablet (25 MG) by mouth daily (Patient taking differently: Take 12.5 mg by mouth daily Take 1/2 tablet (25 MG) by mouth daily)     hydrochlorothiazide (HYDRODIURIL) 25 MG tablet Take 25 mg by mouth daily     metoprolol tartrate (LOPRESSOR) 25 MG tablet Take 1 tablet (25 mg)  by mouth 2 times daily     predniSONE (DELTASONE) 2.5 MG tablet Take 2.5 mg by mouth every morning     tamsulosin (FLOMAX) 0.4 MG capsule Take 2 capsules (0.8 mg) by mouth daily     No current facility-administered medications for this visit.     Medications Discontinued During This Encounter   Medication Reason     predniSONE (DELTASONE) 5 MG tablet        Physical Exam   Vital Signs: /62 (BP Location: Left arm, Patient Position: Sitting, Cuff Size: Adult Regular)   Pulse 72   Wt 77.9 kg (171 lb 11.2 oz)   BMI 24.64 kg/m      GENERAL APPEARANCE: alert and no distress  HENT: mouth without ulcers or lesions  LYMPHATICS: no cervical or supraclavicular nodes  RESP: lungs clear to auscultation - no rales, rhonchi or wheezes  CV: regular rhythm, normal rate, no rub, no murmur  EDEMA: no LE edema bilaterally  ABDOMEN: soft, nondistended, nontender, bowel sounds normal  MS: extremities normal - no gross deformities noted, no evidence of inflammation in joints, no muscle tenderness  SKIN: no rash  TX KIDNEY: normal  DIALYSIS ACCESS: none    Data         Latest Ref Rng & Units 3/8/2019     9:25 AM 11/21/2016    10:05 AM 8/3/2015     8:55 AM   Renal   Na (external) 136 - 145 mmol/L 139     140     141       K (external) 3.5 - 5.1 mmol/L 3.3     3.7     3.9       Cl 101 - 109 mmol/L 104     99     102       Cl (external) 101 - 109 mmol/L 104     99     102       CO2 (external) 25 - 33 mmol/L 28     30     29       BUN (external) 8 - 26 mg/dl 34     19     24       Cr (external) 0.7 - 1.3 mg/dl 1.4     1.3     1.3       Glucose (external) 70 - 110 mg/dl 91     90     86       Ca (external) 8.4 - 10.2 mg/dl 8.6     9.4     9.1           This result is from an external source.            Latest Ref Rng & Units 3/8/2019     9:25 AM 11/21/2016    10:05 AM   Heme   WBC (external) 4.0 - 11.0 10^3 7.5     7.9       Hgb (external) 13.5 - 17.5 g/dl 15.8     16.7       Plt (external) 150 - 450 10^3 327     343       ABSOLUTE  NEUTROPHILS (EXTERNAL) 1.6 - 8.3 10^3 4.2        ABSOLUTE LYMPHOCYTES (EXTERNAL) 0.6 - 5.0 10^3 2.1        ABSOLUTE MONOCYTES (EXTERNAL) 0.0 - 1.3 10^3 0.9        ABSOLUTE EOSINOPHILS (EXTERNAL) 0.0 - 0.8 10^3 0.2        ABSOLUTE BASOPHILS (EXTERNAL) 0.0 - 0.2 10^3 0.0            This result is from an external source.         Latest Ref Rng & Units 3/8/2019     9:25 AM 11/21/2016    10:05 AM 8/3/2015     8:55 AM   Liver   AP (external) 40 - 150 u/l 91     85     89       TBili (external) 0.2 - 1.2 mg/dl 1.0     1.3     1.0       ALT (external) 0 - 55 u/l 27     38     25       AST (external) 5 - 34 u/l 31     27     30       Tot Protein (external) 6.4 - 8.3 g/dl 7.0     6.7     6.3       Albumin (external) 3.5 - 5.0 g/dl 4.0     4.1     3.4           This result is from an external source.

## 2023-06-24 NOTE — PATIENT INSTRUCTIONS
Patient Recommendations:  - No new recommendations at this time.    Transplant Patient Information  Your Post Transplant Coordinator is: Maria D Sam  For non urgent items, we encourage you to contact your coordinator/care team online via [email or web address]  You and your care team can also contact your transplant coordinator Monday - Friday, 8am - 5pm at [phone] (Option 2 to reach the coordinator or Option 4 to schedule an appointment).  After hours for urgent matters, please call [facility] at [phone].

## 2024-06-01 ENCOUNTER — HEALTH MAINTENANCE LETTER (OUTPATIENT)
Age: 79
End: 2024-06-01

## 2025-06-14 ENCOUNTER — HEALTH MAINTENANCE LETTER (OUTPATIENT)
Age: 80
End: 2025-06-14

## 2025-08-11 ENCOUNTER — PRE VISIT (OUTPATIENT)
Dept: UROLOGY | Facility: CLINIC | Age: 80
End: 2025-08-11

## 2025-08-11 ENCOUNTER — VIRTUAL VISIT (OUTPATIENT)
Dept: UROLOGY | Facility: CLINIC | Age: 80
End: 2025-08-11
Payer: MEDICARE

## 2025-08-11 VITALS — BODY MASS INDEX: 24.34 KG/M2 | HEIGHT: 70 IN | WEIGHT: 170 LBS

## 2025-08-11 DIAGNOSIS — Z94.0 KIDNEY REPLACED BY TRANSPLANT: ICD-10-CM

## 2025-08-11 DIAGNOSIS — N40.1 BENIGN LOCALIZED PROSTATIC HYPERPLASIA WITH LOWER URINARY TRACT SYMPTOMS (LUTS): Primary | ICD-10-CM

## 2025-08-11 PROCEDURE — 1126F AMNT PAIN NOTED NONE PRSNT: CPT | Mod: 95 | Performed by: UROLOGY

## 2025-08-11 PROCEDURE — 98002 SYNCH AUDIO-VIDEO NEW MOD 45: CPT | Performed by: UROLOGY

## 2025-08-11 RX ORDER — AZATHIOPRINE 50 MG/1
25 TABLET ORAL DAILY
Qty: 45 TABLET | Refills: 0 | Status: CANCELLED | OUTPATIENT
Start: 2025-08-11

## 2025-08-11 RX ORDER — FINASTERIDE 5 MG/1
5 TABLET, FILM COATED ORAL DAILY
Qty: 90 TABLET | Refills: 3 | Status: SHIPPED | OUTPATIENT
Start: 2025-08-11

## 2025-08-11 ASSESSMENT — PAIN SCALES - GENERAL: PAINLEVEL_OUTOF10: NO PAIN (0)

## 2025-08-20 ENCOUNTER — TELEPHONE (OUTPATIENT)
Dept: UROLOGY | Facility: CLINIC | Age: 80
End: 2025-08-20
Payer: MEDICARE

## 2025-08-25 ENCOUNTER — TELEPHONE (OUTPATIENT)
Dept: UROLOGY | Facility: CLINIC | Age: 80
End: 2025-08-25
Payer: MEDICARE